# Patient Record
Sex: MALE | Race: WHITE | NOT HISPANIC OR LATINO | Employment: OTHER
[De-identification: names, ages, dates, MRNs, and addresses within clinical notes are randomized per-mention and may not be internally consistent; named-entity substitution may affect disease eponyms.]

---

## 2018-03-05 PROBLEM — Z86.010 PERSONAL HISTORY OF COLONIC POLYPS: Status: ACTIVE | Noted: 2018-03-05

## 2018-03-05 PROBLEM — K20.0 EOSINOPHILIC ESOPHAGITIS: Status: ACTIVE | Noted: 2018-03-05

## 2018-03-05 PROBLEM — Z86.0100 PERSONAL HISTORY OF COLONIC POLYPS: Status: ACTIVE | Noted: 2018-03-05

## 2018-06-12 ENCOUNTER — EXTERNAL RECORD (OUTPATIENT)
Dept: HEALTH INFORMATION MANAGEMENT | Facility: OTHER | Age: 63
End: 2018-06-12

## 2019-01-14 ENCOUNTER — HOSPITAL (OUTPATIENT)
Dept: OTHER | Age: 64
End: 2019-01-14

## 2019-01-15 ENCOUNTER — HOSPITAL (OUTPATIENT)
Dept: OTHER | Age: 64
End: 2019-01-15

## 2019-10-30 ENCOUNTER — OFFICE VISIT (OUTPATIENT)
Dept: OTHER | Facility: HOSPITAL | Age: 64
End: 2019-10-30
Attending: FAMILY MEDICINE
Payer: COMMERCIAL

## 2019-10-30 ENCOUNTER — TELEPHONE (OUTPATIENT)
Dept: NEUROLOGY | Facility: CLINIC | Age: 64
End: 2019-10-30

## 2019-10-30 ENCOUNTER — OFFICE VISIT (OUTPATIENT)
Dept: NEUROLOGY | Facility: CLINIC | Age: 64
End: 2019-10-30
Payer: COMMERCIAL

## 2019-10-30 VITALS
BODY MASS INDEX: 31.4 KG/M2 | WEIGHT: 212 LBS | SYSTOLIC BLOOD PRESSURE: 142 MMHG | HEART RATE: 80 BPM | RESPIRATION RATE: 18 BRPM | DIASTOLIC BLOOD PRESSURE: 84 MMHG | HEIGHT: 69 IN

## 2019-10-30 DIAGNOSIS — S83.8X1A ACUTE LATERAL MENISCAL INJURY OF RIGHT KNEE, INITIAL ENCOUNTER: ICD-10-CM

## 2019-10-30 DIAGNOSIS — Z77.21 PERSONAL HISTORY OF EXPOSURE TO POTENTIALLY HAZARDOUS BODY FLUIDS: Primary | ICD-10-CM

## 2019-10-30 DIAGNOSIS — S83.411A SPRAIN OF MEDIAL COLLATERAL LIGAMENT OF RIGHT KNEE, INITIAL ENCOUNTER: ICD-10-CM

## 2019-10-30 DIAGNOSIS — M25.461 EFFUSION OF RIGHT KNEE: ICD-10-CM

## 2019-10-30 DIAGNOSIS — M25.561 ACUTE PAIN OF RIGHT KNEE: ICD-10-CM

## 2019-10-30 PROCEDURE — 86803 HEPATITIS C AB TEST: CPT

## 2019-10-30 PROCEDURE — 20611 DRAIN/INJ JOINT/BURSA W/US: CPT | Performed by: PHYSICAL MEDICINE & REHABILITATION

## 2019-10-30 PROCEDURE — 87340 HEPATITIS B SURFACE AG IA: CPT

## 2019-10-30 PROCEDURE — 99204 OFFICE O/P NEW MOD 45 MIN: CPT | Performed by: PHYSICAL MEDICINE & REHABILITATION

## 2019-10-30 PROCEDURE — 86701 HIV-1ANTIBODY: CPT

## 2019-10-30 RX ORDER — SILDENAFIL 100 MG/1
TABLET, FILM COATED ORAL
Refills: 1 | COMMUNITY
Start: 2019-07-26 | End: 2020-08-03

## 2019-10-30 RX ORDER — LIDOCAINE HYDROCHLORIDE 10 MG/ML
5 INJECTION, SOLUTION INFILTRATION; PERINEURAL ONCE
Status: COMPLETED | OUTPATIENT
Start: 2019-10-30 | End: 2019-10-30

## 2019-10-30 RX ORDER — LIDOCAINE HYDROCHLORIDE 10 MG/ML
4 INJECTION, SOLUTION INFILTRATION; PERINEURAL ONCE
Status: COMPLETED | OUTPATIENT
Start: 2019-10-30 | End: 2019-10-30

## 2019-10-30 RX ORDER — TRIAMCINOLONE ACETONIDE 40 MG/ML
40 INJECTION, SUSPENSION INTRA-ARTICULAR; INTRAMUSCULAR ONCE
Status: COMPLETED | OUTPATIENT
Start: 2019-10-30 | End: 2019-10-30

## 2019-10-30 RX ADMIN — TRIAMCINOLONE ACETONIDE 40 MG: 40 INJECTION, SUSPENSION INTRA-ARTICULAR; INTRAMUSCULAR at 16:05:00

## 2019-10-30 RX ADMIN — LIDOCAINE HYDROCHLORIDE 5 ML: 10 INJECTION, SOLUTION INFILTRATION; PERINEURAL at 16:03:00

## 2019-10-30 RX ADMIN — LIDOCAINE HYDROCHLORIDE 4 ML: 10 INJECTION, SOLUTION INFILTRATION; PERINEURAL at 16:04:00
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
## 2019-10-30 NOTE — H&P
[facility] 98  [address] [facility] H&P    Requesting Physician: Meg Sands    Chief Complaint (Reason for Visit):  No chief complaint on file. History of Present Illness:   The patient is a 59year old RIGHT handed m 1  ergocalciferol 67990 units Oral Cap, TK 1 C PO 1 TIME A WK, Disp: , Rfl: 1  PEG 3350-KCl-NaBcb-NaCl-NaSulf (PEG 3350/ELECTROLYTES) 240 g Oral Recon Soln, Take as directed by physician., Disp: 4000 mL, Rfl: 0  OMEPRAZOLE 20 MG Oral Capsule Delayed Releas lower extremities   Sensation: Intact to light touch in all dermatomes of the lower extremities  Reflexes: 2/4 at L4 and S1  Alex Test: negative for pain over patella  Lachmans: negative for instability  Anterior / Posterior drawer: negative for instabili

## 2019-10-30 NOTE — TELEPHONE ENCOUNTER
[address] [facility] for authorization of approval for RIGHT knee aspiration and CSI under ultrasound guidance CPT code: 69215,79133, - APPROVEDSpoke to [address] [place] [address]  who states no authorization is required. Reference call# [id number].  Will inform nursing

## 2019-10-30 NOTE — TELEPHONE ENCOUNTER
93 [facility] for authorization of approval for MRI KNEE, RIGHT CPT Code 83085- Spoke to [address] [place] [address]  who states no authorization is required. Reference call# [id number]. Will inform patient.    L/M for patient, informed insurance was verified and MRI KN

## 2019-10-31 ENCOUNTER — TELEPHONE (OUTPATIENT)
Dept: NEUROLOGY | Facility: CLINIC | Age: 64
End: 2019-10-31

## 2019-11-06 NOTE — PROCEDURES
[address]   Knee Joint Injection Procedure Note    CHIEF COMPLAINT:  No chief complaint on file.       PROCEDURE PERFORMED:  right knee intra-articular corticosteroid injection under ultrasound guidance    I identified. Permanent pictures were saved. INSTRUCTIONS GIVEN TO PATIENT:    \"You will see an effect in the next 2-3 days.   Please contact me if you have fevers, worsening swelling, worsening pain, decreased range of motion, increased redness, chill

## 2019-11-06 NOTE — TELEPHONE ENCOUNTER
Received disc of X-ray of right knee done 10/29/19 at [facility]-no report  Disc loaded to [address] [place] [address] PACs and mailed back to patient

## 2019-11-15 ENCOUNTER — HOSPITAL ENCOUNTER (OUTPATIENT)
Dept: MRI IMAGING | Facility: HOSPITAL | Age: 64
Discharge: HOME | End: 2019-11-15
Attending: PHYSICAL MEDICINE & REHABILITATION
Payer: COMMERCIAL

## 2019-11-15 DIAGNOSIS — S83.8X1A ACUTE LATERAL MENISCAL INJURY OF RIGHT KNEE, INITIAL ENCOUNTER: ICD-10-CM

## 2019-11-15 DIAGNOSIS — S83.411A SPRAIN OF MEDIAL COLLATERAL LIGAMENT OF RIGHT KNEE, INITIAL ENCOUNTER: ICD-10-CM

## 2019-11-15 DIAGNOSIS — M25.561 ACUTE PAIN OF RIGHT KNEE: ICD-10-CM

## 2019-11-15 DIAGNOSIS — M25.461 EFFUSION OF RIGHT KNEE: ICD-10-CM

## 2019-11-15 PROCEDURE — 73721 MRI JNT OF LWR EXTRE W/O DYE: CPT | Performed by: PHYSICAL MEDICINE & REHABILITATION

## 2019-12-12 ENCOUNTER — HOSPITAL ENCOUNTER (OUTPATIENT)
Dept: GENERAL RADIOLOGY | Facility: HOSPITAL | Age: 64
Discharge: HOME | End: 2019-12-12
Attending: PHYSICAL MEDICINE & REHABILITATION
Payer: COMMERCIAL

## 2019-12-12 ENCOUNTER — OFFICE VISIT (OUTPATIENT)
Dept: NEUROLOGY | Facility: CLINIC | Age: 64
End: 2019-12-12
Payer: COMMERCIAL

## 2019-12-12 VITALS — RESPIRATION RATE: 18 BRPM | BODY MASS INDEX: 31.4 KG/M2 | HEIGHT: 69 IN | WEIGHT: 212 LBS

## 2019-12-12 DIAGNOSIS — S83.411A SPRAIN OF MEDIAL COLLATERAL LIGAMENT OF RIGHT KNEE, INITIAL ENCOUNTER: ICD-10-CM

## 2019-12-12 DIAGNOSIS — S83.8X1A ACUTE LATERAL MENISCAL INJURY OF RIGHT KNEE, INITIAL ENCOUNTER: ICD-10-CM

## 2019-12-12 DIAGNOSIS — M25.521 RIGHT ELBOW PAIN: ICD-10-CM

## 2019-12-12 DIAGNOSIS — M25.461 EFFUSION OF RIGHT KNEE: ICD-10-CM

## 2019-12-12 DIAGNOSIS — M25.561 ACUTE PAIN OF RIGHT KNEE: ICD-10-CM

## 2019-12-12 DIAGNOSIS — M25.521 RIGHT ELBOW PAIN: Primary | ICD-10-CM

## 2019-12-12 PROCEDURE — 99214 OFFICE O/P EST MOD 30 MIN: CPT | Performed by: PHYSICAL MEDICINE & REHABILITATION

## 2019-12-12 PROCEDURE — 73080 X-RAY EXAM OF ELBOW: CPT | Performed by: PHYSICAL MEDICINE & REHABILITATION
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
## 2019-12-12 NOTE — PATIENT INSTRUCTIONS
1) Get XR of the RIGHT elbow on your way out today  2) Start using diclofenac gel over the elbow 2-4 times per day  3) Keep doing what you are doing for the knee. 4) Follow up with me as needed.

## 2020-04-13 ENCOUNTER — TELEPHONE (OUTPATIENT)
Dept: NEUROLOGY | Facility: CLINIC | Age: 65
End: 2020-04-13

## 2020-04-14 ENCOUNTER — TELEPHONE (OUTPATIENT)
Dept: NEUROLOGY | Facility: CLINIC | Age: 65
End: 2020-04-14

## 2020-04-14 ENCOUNTER — VIRTUAL PHONE E/M (OUTPATIENT)
Dept: NEUROLOGY | Facility: CLINIC | Age: 65
End: 2020-04-14
Payer: COMMERCIAL

## 2020-04-14 DIAGNOSIS — M22.2X9 PATELLOFEMORAL PAIN SYNDROME, UNSPECIFIED LATERALITY: ICD-10-CM

## 2020-04-14 DIAGNOSIS — S83.411A SPRAIN OF MEDIAL COLLATERAL LIGAMENT OF RIGHT KNEE, INITIAL ENCOUNTER: ICD-10-CM

## 2020-04-14 DIAGNOSIS — M17.12 PRIMARY OSTEOARTHRITIS OF LEFT KNEE: Primary | ICD-10-CM

## 2020-04-14 DIAGNOSIS — M25.461 EFFUSION OF RIGHT KNEE: ICD-10-CM

## 2020-04-14 DIAGNOSIS — S83.8X1A ACUTE LATERAL MENISCAL INJURY OF RIGHT KNEE, INITIAL ENCOUNTER: ICD-10-CM

## 2020-04-14 DIAGNOSIS — M25.561 ACUTE PAIN OF RIGHT KNEE: ICD-10-CM

## 2020-04-14 PROCEDURE — 99213 OFFICE O/P EST LOW 20 MIN: CPT | Performed by: PHYSICAL MEDICINE & REHABILITATION

## 2020-04-14 NOTE — PROGRESS NOTES
[address]  Televisit Note    CHIEF COMPLAINT:      Virtual/Telephone Check-In    Sammy Gómez verbally consents to a Virtual/Telephone Check-In service on 04/14/20.   Patient understands and accepts fi DINNER 180 capsule 0   • Sildenafil Citrate 100 MG Oral Tab TK 1 T PO ONCE A DAY PRN  1   • aspirin 81 MG Oral Tab EC Take 81 mg by mouth daily.      • Levothyroxine Sodium 137 MCG Oral Tab TK 1 T PO QD IN THE MORNING OES  1   • ergocalciferol 85169 units O Intact  PCL:     Intact  MCL:     Mild thickening with increased intrasubstance signal within the MCL with mild periligamentous edema. There is no full-thickness tear.   LCL COMPLEX:             Intact     PATELLOFEMORAL EXTENSOR MECHANISM:             Mil osteoarthritis. Low-grade strain at the origin of the soleus muscle. Grade 1 MCL sprain. Mild quadriceps and patellar tendinosis.            Dictated by (CST): Brett Baxter MD on 11/15/2019 at 12:38       Approved by (CST): Brett Baxter MD on 19

## 2020-04-14 NOTE — TELEPHONE ENCOUNTER
Called [facility] BS for authorization of approval of LEFT knee Durolane and CSI under ultrasound guidance cpt codes 21934, [id number], [id number]. Talked to Jenette Cranker. who states authorization is not required.  Reference #  [id number]             Procedure ma

## 2020-04-15 ENCOUNTER — HOSPITAL ENCOUNTER (OUTPATIENT)
Dept: GENERAL RADIOLOGY | Facility: HOSPITAL | Age: 65
Discharge: HOME | End: 2020-04-15
Attending: PHYSICAL MEDICINE & REHABILITATION
Payer: COMMERCIAL

## 2020-04-15 ENCOUNTER — TELEPHONE (OUTPATIENT)
Dept: NEUROLOGY | Facility: CLINIC | Age: 65
End: 2020-04-15

## 2020-04-15 DIAGNOSIS — M17.12 PRIMARY OSTEOARTHRITIS OF LEFT KNEE: ICD-10-CM

## 2020-04-15 DIAGNOSIS — M22.2X9 PATELLOFEMORAL PAIN SYNDROME, UNSPECIFIED LATERALITY: ICD-10-CM

## 2020-04-15 DIAGNOSIS — S83.8X1A ACUTE LATERAL MENISCAL INJURY OF RIGHT KNEE, INITIAL ENCOUNTER: ICD-10-CM

## 2020-04-15 DIAGNOSIS — M25.461 EFFUSION OF RIGHT KNEE: ICD-10-CM

## 2020-04-15 DIAGNOSIS — M25.561 ACUTE PAIN OF RIGHT KNEE: ICD-10-CM

## 2020-04-15 DIAGNOSIS — S83.411A SPRAIN OF MEDIAL COLLATERAL LIGAMENT OF RIGHT KNEE, INITIAL ENCOUNTER: ICD-10-CM

## 2020-04-15 DIAGNOSIS — M17.11 PRIMARY OSTEOARTHRITIS OF RIGHT KNEE: Primary | ICD-10-CM

## 2020-04-15 PROCEDURE — 73564 X-RAY EXAM KNEE 4 OR MORE: CPT | Performed by: PHYSICAL MEDICINE & REHABILITATION

## 2020-04-15 NOTE — TELEPHONE ENCOUNTER
[facility] radiology called stating patient is downstairs waiting to have Xray on his right knee but the order was for L Knee. Per Virtual visit on 04/14/20 patient was following up for R Knee pain. XR R Knee placed.

## 2020-04-15 NOTE — TELEPHONE ENCOUNTER
Called [facility] BS for authorization of approval of RIGHT knee HA and CSI under ultrasound guidance cpt codes 98307, [id number], [id number]. Talked to Spring TSAI/Florencia RICHARDS  who states authorization is not required.  Reference #   [id number] cpt code R23

## 2020-04-15 NOTE — TELEPHONE ENCOUNTER
Yes. Thank you for ordering. Injections ordered. Please schedule for June or July to have injections performed. Colonel Cates.  Faina Koenig MD, [address] [facility]  Physical Medicine and Rehabilitation/Sports Medicine  MEDICAL CENTER [facility]

## 2020-04-15 NOTE — TELEPHONE ENCOUNTER
RTC in June 2020 for a left knee hyaluronic acid and corticosteroid injection under ultrasound guidance

## 2020-04-16 NOTE — TELEPHONE ENCOUNTER
Called [facility] BS for authorization of approval of RIGHT knee HA and CSI under ultrasound guidance cpt codes 62089, V244621, [id number]-, .   Talked to Anusha Lopez, who states cpt code  is not considered medically necessary so it is NOT a covered

## 2020-05-04 NOTE — TELEPHONE ENCOUNTER
Called [facility] BS for authorization of approval of RIGHT knee HA and CSI under ultrasound guidance cpt codes 04055, 76691, Synvisc One cpt code  and  or [id number]-, .  Talked to Maria Isabel Vela states cpt code ,  are not considered med

## 2020-05-06 NOTE — TELEPHONE ENCOUNTER
Left detailed message informing patient HA injections are not covered by his insurance and he will be required to pay the full amount. Instructed patient to call back and let us know if he would like to have injections and that we can schedule him in June.

## 2020-05-12 NOTE — TELEPHONE ENCOUNTER
Patient verbalized understanding that he will be responsible for payment of Injections and is declining to proceed with Injections and will follow up with an office appt instead

## 2020-06-24 ENCOUNTER — OFFICE VISIT (OUTPATIENT)
Dept: NEUROLOGY | Facility: CLINIC | Age: 65
End: 2020-06-24
Payer: COMMERCIAL

## 2020-06-24 VITALS
DIASTOLIC BLOOD PRESSURE: 70 MMHG | HEART RATE: 82 BPM | BODY MASS INDEX: 30.36 KG/M2 | WEIGHT: 205 LBS | SYSTOLIC BLOOD PRESSURE: 130 MMHG | HEIGHT: 69 IN

## 2020-06-24 DIAGNOSIS — G89.29 CHRONIC PAIN OF BOTH KNEES: ICD-10-CM

## 2020-06-24 DIAGNOSIS — M25.462 BILATERAL KNEE EFFUSIONS: ICD-10-CM

## 2020-06-24 DIAGNOSIS — M25.561 CHRONIC PAIN OF BOTH KNEES: ICD-10-CM

## 2020-06-24 DIAGNOSIS — M25.562 CHRONIC PAIN OF BOTH KNEES: ICD-10-CM

## 2020-06-24 DIAGNOSIS — M25.461 BILATERAL KNEE EFFUSIONS: ICD-10-CM

## 2020-06-24 DIAGNOSIS — M17.0 BILATERAL PRIMARY OSTEOARTHRITIS OF KNEE: Primary | ICD-10-CM

## 2020-06-24 PROCEDURE — 99214 OFFICE O/P EST MOD 30 MIN: CPT | Performed by: PHYSICAL MEDICINE & REHABILITATION

## 2020-06-24 PROCEDURE — 20611 DRAIN/INJ JOINT/BURSA W/US: CPT | Performed by: PHYSICAL MEDICINE & REHABILITATION

## 2020-06-24 RX ORDER — LIDOCAINE HYDROCHLORIDE 10 MG/ML
3 INJECTION, SOLUTION INFILTRATION; PERINEURAL ONCE
Status: COMPLETED | OUTPATIENT
Start: 2020-06-24 | End: 2020-06-24

## 2020-06-24 RX ORDER — TRIAMCINOLONE ACETONIDE 40 MG/ML
40 INJECTION, SUSPENSION INTRA-ARTICULAR; INTRAMUSCULAR ONCE
Status: COMPLETED | OUTPATIENT
Start: 2020-06-24 | End: 2020-06-24

## 2020-06-24 RX ORDER — LIDOCAINE HYDROCHLORIDE 10 MG/ML
4 INJECTION, SOLUTION INFILTRATION; PERINEURAL ONCE
Status: COMPLETED | OUTPATIENT
Start: 2020-06-24 | End: 2020-06-24

## 2020-06-24 NOTE — PROCEDURES
[address]   Knee Joint Injection Procedure Note    CHIEF COMPLAINT:  Patient presents with: Other: Pt presents with pain bilateral knees. Pain is 4-5/10 and walkes pt up at night. Walking relieves pain.  D intra-articular space. The needle was then removed, hemostasis was obtained, Band-Aid was applied. Patient tolerated the procedure well. The patient was able to get up and ambulate. The pre-injection pain score was 5/10.  The post-injection pain score was further evaluation\"      Parker Byers.  Erika Blake MD, [address] [facility]  Physical Medicine and Rehabilitation/Sports Medicine  MEDICAL CENTER [facility]

## 2020-06-24 NOTE — PROGRESS NOTES
[address]  Progress Note    CHIEF COMPLAINT:  Patient presents with: Other: Pt presents with pain bilateral knees. Pain is 4-5/10 and walkes pt up at night. Walking relieves pain. Denies n/t. Stiffness. MINUTES BEFORE BREAKFAST 90 capsule 0       ALLERGIES:   No Known Allergies    REVIEW OF SYSTEMS:   Review of Systems   Constitutional: Negative. HENT: Negative. Eyes: Negative. Respiratory: Negative. Cardiovascular: Negative.     Gastrointestin \"catch\"    Gait:  antalgic    Data  No visits with results within 6 Month(s) from this visit.    Latest known visit with results is:   Office Visit on 10/30/2019   Component Date Value Ref Range Status   • Hepatitis B Surface Antigen 10/30/2019 Nonreactiv acid injections. RTC in 2 months  Discharge Instructions were provided as documented in AVS summary. The patient was in agreement with the assessment and plan. All questions were answered. There were no barriers to learning.        Bilateral primar

## 2020-07-02 ENCOUNTER — TELEPHONE (OUTPATIENT)
Dept: NEUROLOGY | Facility: CLINIC | Age: 65
End: 2020-07-02

## 2020-07-11 ENCOUNTER — LAB ENCOUNTER (OUTPATIENT)
Dept: LAB | Facility: HOSPITAL | Age: 65
End: 2020-07-11
Attending: INTERNAL MEDICINE
Payer: COMMERCIAL

## 2020-07-11 DIAGNOSIS — Z01.818 PRE-OP TESTING: ICD-10-CM

## 2020-07-11 LAB — SARS-COV-2 RNA RESP QL NAA+PROBE: NOT DETECTED

## 2020-10-01 ENCOUNTER — TELEPHONE (OUTPATIENT)
Dept: NEUROLOGY | Facility: CLINIC | Age: 65
End: 2020-10-01

## 2020-10-01 ENCOUNTER — OFFICE VISIT (OUTPATIENT)
Dept: NEUROLOGY | Facility: CLINIC | Age: 65
End: 2020-10-01
Payer: COMMERCIAL

## 2020-10-01 VITALS — BODY MASS INDEX: 30.36 KG/M2 | HEIGHT: 69 IN | WEIGHT: 205 LBS

## 2020-10-01 DIAGNOSIS — G89.29 CHRONIC PAIN OF BOTH KNEES: ICD-10-CM

## 2020-10-01 DIAGNOSIS — S83.8X1A ACUTE LATERAL MENISCAL INJURY OF RIGHT KNEE, INITIAL ENCOUNTER: ICD-10-CM

## 2020-10-01 DIAGNOSIS — M22.2X9 PATELLOFEMORAL PAIN SYNDROME, UNSPECIFIED LATERALITY: ICD-10-CM

## 2020-10-01 DIAGNOSIS — M17.11 PRIMARY OSTEOARTHRITIS OF RIGHT KNEE: ICD-10-CM

## 2020-10-01 DIAGNOSIS — M25.461 BILATERAL KNEE EFFUSIONS: Primary | ICD-10-CM

## 2020-10-01 DIAGNOSIS — M25.461 EFFUSION OF RIGHT KNEE: ICD-10-CM

## 2020-10-01 DIAGNOSIS — M17.12 PRIMARY OSTEOARTHRITIS OF LEFT KNEE: ICD-10-CM

## 2020-10-01 DIAGNOSIS — M17.0 BILATERAL PRIMARY OSTEOARTHRITIS OF KNEE: ICD-10-CM

## 2020-10-01 DIAGNOSIS — M25.561 ACUTE PAIN OF RIGHT KNEE: ICD-10-CM

## 2020-10-01 DIAGNOSIS — M25.561 CHRONIC PAIN OF BOTH KNEES: ICD-10-CM

## 2020-10-01 DIAGNOSIS — M25.562 CHRONIC PAIN OF BOTH KNEES: ICD-10-CM

## 2020-10-01 DIAGNOSIS — M25.462 BILATERAL KNEE EFFUSIONS: Primary | ICD-10-CM

## 2020-10-01 PROCEDURE — 3008F BODY MASS INDEX DOCD: CPT | Performed by: PHYSICAL MEDICINE & REHABILITATION

## 2020-10-01 PROCEDURE — 99214 OFFICE O/P EST MOD 30 MIN: CPT | Performed by: PHYSICAL MEDICINE & REHABILITATION

## 2020-10-01 NOTE — TELEPHONE ENCOUNTER
BILATERAL knee HA and CSI under ultrasound guidance CPT Code 18657, , 77214 () X2-partial approval.     Called [facility] for authorization of approval for above.  Spoke to Taye Lewis who states no authorization is required for   CPT CODE 06316, T37

## 2020-10-01 NOTE — PATIENT INSTRUCTIONS
1) My office will call you to schedule the BILATERAL knee HA and CSI under ultrasound guidance once the procedure is approved by your insurance carrier. 2) I will give you home exercises. Please do them daily.

## 2020-10-01 NOTE — PROGRESS NOTES
[address]  Progress Note    CHIEF COMPLAINT:  Patient presents with:  Knee Pain: LOV 06/24/20 Pt states that hes here den knee pain. History of Present Illness:   The patient is a 72year old right-hope MG Oral Tab EC Take 81 mg by mouth daily. • Levothyroxine Sodium 137 MCG Oral Tab TK 1 T PO QD IN THE MORNING OES  1       ALLERGIES:   No Known Allergies    REVIEW OF SYSTEMS:   Review of Systems   Constitutional: Negative. HENT: Negative.     Eyes: Component Date Value Ref Range Status   • Rapid SARS-CoV-2 by PCR 07/11/2020 Not Detected  Not Detected Final   ]      Radiology Imaging:  I reviewed with the patient his X-ray of the knees right from 4/15/2020  XR KNEE, COMPLETE (4 OR MORE VIEWS), RIGHT learning.      Bilateral knee effusions  (primary encounter diagnosis)  Bilateral primary osteoarthritis of knee  Chronic pain of both knees  Acute pain of right knee  Primary osteoarthritis of left knee  Effusion of right knee  Patellofemoral pain syndrome

## 2020-10-02 NOTE — TELEPHONE ENCOUNTER
HA is not a covered benefit in patient's insurance plan. Will check with Dr. Anitra Vieyra to see if patient can be scheduled for just steroid injections.

## 2020-10-07 NOTE — TELEPHONE ENCOUNTER
Spoke to patient and notified him of denial. Patient was understanding. He states that his knees are doing okay now but if they worsen he will make a follow-up with Dr. Sidney Arango. He was very thankful for the return call.

## 2020-12-18 NOTE — TELEPHONE ENCOUNTER
Per Jag Handing; [address] denial letter received for . Requested to call [place] to verify coverage for .      Called [facility] provider services, spoke to Jatin who states Darby Gutierrez) is not cover in the members plan nor medically necessary per

## 2021-02-26 ENCOUNTER — LAB REQUISITION (OUTPATIENT)
Dept: LAB | Age: 66
End: 2021-02-26

## 2021-02-26 DIAGNOSIS — E04.2 NONTOXIC MULTINODULAR GOITER: ICD-10-CM

## 2021-02-26 DIAGNOSIS — E03.9 HYPOTHYROIDISM, UNSPECIFIED: ICD-10-CM

## 2021-02-26 DIAGNOSIS — I10 ESSENTIAL (PRIMARY) HYPERTENSION: ICD-10-CM

## 2021-02-26 DIAGNOSIS — R73.01 IMPAIRED FASTING GLUCOSE: ICD-10-CM

## 2021-02-26 DIAGNOSIS — E55.9 VITAMIN D DEFICIENCY, UNSPECIFIED: ICD-10-CM

## 2021-02-26 DIAGNOSIS — E78.2 MIXED HYPERLIPIDEMIA: ICD-10-CM

## 2021-02-26 LAB
CHOLEST SERPL-MCNC: 218 MG/DL
CHOLEST/HDLC SERPL: 4.7 {RATIO}
FASTING DURATION TIME PATIENT: 12 HOURS
HDLC SERPL-MCNC: 46 MG/DL
LDLC SERPL CALC-MCNC: 155 MG/DL
NONHDLC SERPL-MCNC: 172 MG/DL
TRIGL SERPL-MCNC: 84 MG/DL
TSH SERPL-ACNC: 16.16 MCUNITS/ML (ref 0.35–5)

## 2021-02-26 PROCEDURE — 84443 ASSAY THYROID STIM HORMONE: CPT | Performed by: CLINICAL MEDICAL LABORATORY

## 2021-02-26 PROCEDURE — 80061 LIPID PANEL: CPT | Performed by: CLINICAL MEDICAL LABORATORY

## 2021-03-15 ENCOUNTER — LAB REQUISITION (OUTPATIENT)
Dept: LAB | Age: 66
End: 2021-03-15

## 2021-03-15 DIAGNOSIS — R73.01 IMPAIRED FASTING GLUCOSE: ICD-10-CM

## 2021-03-15 DIAGNOSIS — Z00.01 ENCOUNTER FOR GENERAL ADULT MEDICAL EXAMINATION WITH ABNORMAL FINDINGS: ICD-10-CM

## 2021-03-15 DIAGNOSIS — E78.5 HYPERLIPIDEMIA, UNSPECIFIED: ICD-10-CM

## 2021-03-15 DIAGNOSIS — E55.9 VITAMIN D DEFICIENCY, UNSPECIFIED: ICD-10-CM

## 2021-03-15 DIAGNOSIS — K20.0 EOSINOPHILIC ESOPHAGITIS: ICD-10-CM

## 2021-03-15 DIAGNOSIS — Z12.5 ENCOUNTER FOR SCREENING FOR MALIGNANT NEOPLASM OF PROSTATE: ICD-10-CM

## 2021-03-15 LAB
25(OH)D3+25(OH)D2 SERPL-MCNC: 31.8 NG/ML (ref 30–100)
ALBUMIN SERPL-MCNC: 4 G/DL (ref 3.6–5.1)
ALBUMIN/GLOB SERPL: 1.4 {RATIO} (ref 1–2.4)
ALP SERPL-CCNC: 93 UNITS/L (ref 45–117)
ALT SERPL-CCNC: 24 UNITS/L
ANION GAP SERPL CALC-SCNC: 7 MMOL/L (ref 10–20)
AST SERPL-CCNC: 24 UNITS/L
BASOPHILS # BLD: 0.1 K/MCL (ref 0–0.3)
BASOPHILS NFR BLD: 1 %
BILIRUB SERPL-MCNC: 0.7 MG/DL (ref 0.2–1)
BUN SERPL-MCNC: 10 MG/DL (ref 6–20)
BUN/CREAT SERPL: 11 (ref 7–25)
CALCIUM SERPL-MCNC: 9 MG/DL (ref 8.4–10.2)
CHLORIDE SERPL-SCNC: 106 MMOL/L (ref 98–107)
CO2 SERPL-SCNC: 31 MMOL/L (ref 21–32)
CREAT SERPL-MCNC: 0.88 MG/DL (ref 0.67–1.17)
DEPRECATED RDW RBC: 44 FL (ref 39–50)
EOSINOPHIL # BLD: 0.6 K/MCL (ref 0–0.5)
EOSINOPHIL NFR BLD: 11 %
ERYTHROCYTE [DISTWIDTH] IN BLOOD: 12.8 % (ref 11–15)
FASTING DURATION TIME PATIENT: 0 HOURS
GFR SERPLBLD BASED ON 1.73 SQ M-ARVRAT: 90 ML/MIN/1.73M2
GLOBULIN SER-MCNC: 2.8 G/DL (ref 2–4)
GLUCOSE SERPL-MCNC: 114 MG/DL (ref 65–99)
HBA1C MFR BLD: 5.5 % (ref 4.5–5.6)
HCT VFR BLD CALC: 46.1 % (ref 39–51)
HGB BLD-MCNC: 14.3 G/DL (ref 13–17)
IMM GRANULOCYTES # BLD AUTO: 0 K/MCL (ref 0–0.2)
IMM GRANULOCYTES # BLD: 0 %
LYMPHOCYTES # BLD: 1.2 K/MCL (ref 1–4)
LYMPHOCYTES NFR BLD: 21 %
MCH RBC QN AUTO: 29.3 PG (ref 26–34)
MCHC RBC AUTO-ENTMCNC: 31 G/DL (ref 32–36.5)
MCV RBC AUTO: 94.5 FL (ref 78–100)
MONOCYTES # BLD: 0.6 K/MCL (ref 0.3–0.9)
MONOCYTES NFR BLD: 9 %
NEUTROPHILS # BLD: 3.5 K/MCL (ref 1.8–7.7)
NEUTROPHILS NFR BLD: 58 %
NRBC BLD MANUAL-RTO: 0 /100 WBC
PLATELET # BLD AUTO: 375 K/MCL (ref 140–450)
POTASSIUM SERPL-SCNC: 4.4 MMOL/L (ref 3.4–5.1)
PROT SERPL-MCNC: 6.8 G/DL (ref 6.4–8.2)
PSA SERPL-MCNC: 1.13 NG/ML
RBC # BLD: 4.88 MIL/MCL (ref 4.5–5.9)
SODIUM SERPL-SCNC: 140 MMOL/L (ref 135–145)
WBC # BLD: 6.1 K/MCL (ref 4.2–11)

## 2021-03-15 PROCEDURE — 82306 VITAMIN D 25 HYDROXY: CPT | Performed by: CLINICAL MEDICAL LABORATORY

## 2021-03-15 PROCEDURE — 83036 HEMOGLOBIN GLYCOSYLATED A1C: CPT | Performed by: CLINICAL MEDICAL LABORATORY

## 2021-03-15 PROCEDURE — 80053 COMPREHEN METABOLIC PANEL: CPT | Performed by: CLINICAL MEDICAL LABORATORY

## 2021-03-15 PROCEDURE — 85025 COMPLETE CBC W/AUTO DIFF WBC: CPT | Performed by: CLINICAL MEDICAL LABORATORY

## 2021-03-15 PROCEDURE — G0103 PSA SCREENING: HCPCS | Performed by: CLINICAL MEDICAL LABORATORY

## 2021-06-01 PROCEDURE — 80061 LIPID PANEL: CPT | Performed by: CLINICAL MEDICAL LABORATORY

## 2021-06-01 PROCEDURE — 84443 ASSAY THYROID STIM HORMONE: CPT | Performed by: CLINICAL MEDICAL LABORATORY

## 2021-06-01 PROCEDURE — 83036 HEMOGLOBIN GLYCOSYLATED A1C: CPT | Performed by: CLINICAL MEDICAL LABORATORY

## 2021-06-02 ENCOUNTER — LAB REQUISITION (OUTPATIENT)
Dept: LAB | Age: 66
End: 2021-06-02

## 2021-06-02 DIAGNOSIS — E78.5 HYPERLIPIDEMIA, UNSPECIFIED: ICD-10-CM

## 2021-06-02 DIAGNOSIS — R73.01 IMPAIRED FASTING GLUCOSE: ICD-10-CM

## 2021-06-02 DIAGNOSIS — E03.9 HYPOTHYROIDISM, UNSPECIFIED: ICD-10-CM

## 2021-06-02 LAB
CHOLEST SERPL-MCNC: 202 MG/DL
CHOLEST/HDLC SERPL: 4.1 {RATIO}
FASTING DURATION TIME PATIENT: ABNORMAL H
HBA1C MFR BLD: 5.7 % (ref 4.5–5.6)
HDLC SERPL-MCNC: 49 MG/DL
LDLC SERPL CALC-MCNC: 138 MG/DL
NONHDLC SERPL-MCNC: 153 MG/DL
TRIGL SERPL-MCNC: 77 MG/DL
TSH SERPL-ACNC: 0.86 MCUNITS/ML (ref 0.35–5)

## 2021-08-26 ENCOUNTER — TELEPHONE (OUTPATIENT)
Dept: NEUROLOGY | Facility: CLINIC | Age: 66
End: 2021-08-26

## 2021-08-26 ENCOUNTER — OFFICE VISIT (OUTPATIENT)
Dept: NEUROLOGY | Facility: CLINIC | Age: 66
End: 2021-08-26
Payer: MEDICARE

## 2021-08-26 VITALS
HEART RATE: 87 BPM | WEIGHT: 215 LBS | DIASTOLIC BLOOD PRESSURE: 60 MMHG | BODY MASS INDEX: 31.84 KG/M2 | HEIGHT: 69 IN | SYSTOLIC BLOOD PRESSURE: 122 MMHG | OXYGEN SATURATION: 95 %

## 2021-08-26 DIAGNOSIS — M25.561 CHRONIC PAIN OF BOTH KNEES: ICD-10-CM

## 2021-08-26 DIAGNOSIS — M25.461 BILATERAL KNEE EFFUSIONS: Primary | ICD-10-CM

## 2021-08-26 DIAGNOSIS — M22.2X9 PATELLOFEMORAL PAIN SYNDROME, UNSPECIFIED LATERALITY: ICD-10-CM

## 2021-08-26 DIAGNOSIS — G89.29 CHRONIC PAIN OF BOTH KNEES: ICD-10-CM

## 2021-08-26 DIAGNOSIS — M25.561 ACUTE PAIN OF RIGHT KNEE: ICD-10-CM

## 2021-08-26 DIAGNOSIS — M17.0 BILATERAL PRIMARY OSTEOARTHRITIS OF KNEE: ICD-10-CM

## 2021-08-26 DIAGNOSIS — M25.462 BILATERAL KNEE EFFUSIONS: Primary | ICD-10-CM

## 2021-08-26 DIAGNOSIS — M25.562 CHRONIC PAIN OF BOTH KNEES: ICD-10-CM

## 2021-08-26 PROCEDURE — 99214 OFFICE O/P EST MOD 30 MIN: CPT | Performed by: PHYSICAL MEDICINE & REHABILITATION

## 2021-08-26 NOTE — PATIENT INSTRUCTIONS
1) Get Xr of the left knee today on your way out  2) Tylenol 500-1000 mg every 6-8 hours as needed for pain. No more than 3000 mg daily. 3) Start Glucosamine with Chondroitin 1500/1200 mg daily.    4) My office will call you to schedule the BILATERAL knee

## 2021-08-26 NOTE — PROGRESS NOTES
[address]  Progress Note    CHIEF COMPLAINT:  Patient presents with:  Knee Pain: LOV: 10/1/2020 Patient was reccommneded Knee injections last october but insurance didnt approve and now he has medicare whic omeprazole 20 MG Oral Capsule Delayed Release Take one capsule (20 mg total) by mouth once daily, 30 minutes prior to breakfast. 90 capsule 3   • aspirin 81 MG Oral Tab EC Take 81 mg by mouth daily.      • Levothyroxine Sodium 137 MCG Oral Tab TK 1 T PO QD medial or lateral joint line pain or \"catch\"      Gait:  Genu varum    Data  No visits with results within 6 Month(s) from this visit.    Latest known visit with results is:   [facility] Lab Encounter on 07/11/2020   Component Date Value Ref Range Status   • Rap injections  Discharge Instructions were provided as documented in AVS summary. The patient was in agreement with the assessment and plan. All questions were answered. There were no barriers to learning.        Bilateral knee effusions  (primary encounter

## 2022-01-28 PROBLEM — M25.569 KNEE PAIN: Status: ACTIVE | Noted: 2019-10-30

## 2022-01-28 PROBLEM — M23.302 DERANGEMENT OF LATERAL MENISCUS: Status: ACTIVE | Noted: 2019-10-30

## 2022-01-28 PROBLEM — H81.11 BPPV (BENIGN PAROXYSMAL POSITIONAL VERTIGO), RIGHT: Status: ACTIVE | Noted: 2017-08-08

## 2022-01-28 PROBLEM — M17.0 PRIMARY OSTEOARTHRITIS OF BOTH KNEES: Status: ACTIVE | Noted: 2020-06-24

## 2022-01-28 PROBLEM — R29.90 ABNORMAL NEUROLOGICAL EXAM: Status: ACTIVE | Noted: 2017-08-08

## 2022-01-28 PROBLEM — E78.00 ELEVATED LOW DENSITY LIPOPROTEIN (LDL) CHOLESTEROL LEVEL: Status: ACTIVE | Noted: 2022-01-28

## 2022-01-28 PROBLEM — E03.9 HYPOTHYROIDISM: Status: ACTIVE | Noted: 2017-08-08

## 2022-01-28 PROBLEM — E11.9 TYPE 2 DIABETES MELLITUS WITHOUT COMPLICATION, WITHOUT LONG-TERM CURRENT USE OF INSULIN (HCC): Status: ACTIVE | Noted: 2017-08-08

## 2022-01-28 PROBLEM — R73.03 PREDIABETES: Status: ACTIVE | Noted: 2022-01-28

## 2022-01-28 PROBLEM — K63.5 POLYP OF COLON: Status: ACTIVE | Noted: 2022-01-28

## 2022-01-28 PROBLEM — M25.462 EFFUSION OF BOTH KNEE JOINTS: Status: ACTIVE | Noted: 2019-10-30

## 2022-01-28 PROBLEM — S83.419A SPRAIN OF MEDIAL COLLATERAL LIGAMENT OF KNEE: Status: ACTIVE | Noted: 2019-10-30

## 2022-01-28 PROBLEM — Z09 POSTOPERATIVE EXAMINATION: Status: ACTIVE | Noted: 2022-01-28

## 2022-01-28 PROBLEM — K21.9 GERD WITHOUT ESOPHAGITIS: Status: ACTIVE | Noted: 2017-08-08

## 2022-01-28 PROBLEM — M25.461 EFFUSION OF BOTH KNEE JOINTS: Status: ACTIVE | Noted: 2019-10-30

## 2022-01-28 PROBLEM — R01.1 HEART MURMUR: Status: ACTIVE | Noted: 2022-01-28

## 2022-01-28 PROBLEM — L98.9: Status: ACTIVE | Noted: 2022-01-28

## 2022-01-28 PROBLEM — D64.9 ANEMIA: Status: ACTIVE | Noted: 2021-11-24

## 2022-01-28 PROBLEM — M25.529 ELBOW PAIN: Status: ACTIVE | Noted: 2019-12-12

## 2022-03-15 ENCOUNTER — LAB REQUISITION (OUTPATIENT)
Dept: LAB | Age: 67
End: 2022-03-15

## 2022-03-15 DIAGNOSIS — E55.9 VITAMIN D DEFICIENCY, UNSPECIFIED: ICD-10-CM

## 2022-03-15 DIAGNOSIS — K21.00 GASTRO-ESOPHAGEAL REFLUX DISEASE WITH ESOPHAGITIS: ICD-10-CM

## 2022-03-15 DIAGNOSIS — Z00.00 ENCOUNTER FOR GENERAL ADULT MEDICAL EXAMINATION WITHOUT ABNORMAL FINDINGS: ICD-10-CM

## 2022-03-15 DIAGNOSIS — K20.0 EOSINOPHILIC ESOPHAGITIS: ICD-10-CM

## 2022-03-15 DIAGNOSIS — E03.9 HYPOTHYROIDISM, UNSPECIFIED: ICD-10-CM

## 2022-03-15 DIAGNOSIS — E78.5 HYPERLIPIDEMIA, UNSPECIFIED: ICD-10-CM

## 2022-03-15 DIAGNOSIS — R73.01 IMPAIRED FASTING GLUCOSE: ICD-10-CM

## 2022-03-18 ENCOUNTER — LAB ENCOUNTER (OUTPATIENT)
Dept: LAB | Facility: HOSPITAL | Age: 67
End: 2022-03-18
Attending: INTERNAL MEDICINE
Payer: MEDICARE

## 2022-03-18 DIAGNOSIS — Z01.818 PREOP TESTING: ICD-10-CM

## 2022-03-19 LAB — SARS-COV-2 RNA RESP QL NAA+PROBE: NOT DETECTED

## 2022-03-21 PROBLEM — R13.10 DYSPHAGIA, UNSPECIFIED: Status: ACTIVE | Noted: 2022-03-21

## 2022-04-12 ENCOUNTER — LAB SERVICES (OUTPATIENT)
Dept: LAB | Age: 67
End: 2022-04-12

## 2022-04-12 LAB
25(OH)D3+25(OH)D2 SERPL-MCNC: 27.3 NG/ML (ref 30–100)
BASOPHILS # BLD: 0 K/MCL (ref 0–0.3)
BASOPHILS NFR BLD: 1 %
DEPRECATED RDW RBC: 48.5 FL (ref 39–50)
EOSINOPHIL # BLD: 0.6 K/MCL (ref 0–0.5)
EOSINOPHIL NFR BLD: 11 %
ERYTHROCYTE [DISTWIDTH] IN BLOOD: 16.5 % (ref 11–15)
HBA1C MFR BLD: 5.7 % (ref 4.5–5.6)
HCT VFR BLD CALC: 40 % (ref 39–51)
HGB BLD-MCNC: 11.6 G/DL (ref 13–17)
IMM GRANULOCYTES # BLD AUTO: 0 K/MCL (ref 0–0.2)
IMM GRANULOCYTES # BLD: 0 %
LYMPHOCYTES # BLD: 1.6 K/MCL (ref 1–4)
LYMPHOCYTES NFR BLD: 29 %
MCH RBC QN AUTO: 23.5 PG (ref 26–34)
MCHC RBC AUTO-ENTMCNC: 29 G/DL (ref 32–36.5)
MCV RBC AUTO: 81 FL (ref 78–100)
MONOCYTES # BLD: 0.6 K/MCL (ref 0.3–0.9)
MONOCYTES NFR BLD: 12 %
NEUTROPHILS # BLD: 2.5 K/MCL (ref 1.8–7.7)
NEUTROPHILS NFR BLD: 47 %
NRBC BLD MANUAL-RTO: 0 /100 WBC
PLATELET # BLD AUTO: 428 K/MCL (ref 140–450)
RBC # BLD: 4.94 MIL/MCL (ref 4.5–5.9)
TSH SERPL-ACNC: 1.4 MCUNITS/ML (ref 0.35–5)
WBC # BLD: 5.3 K/MCL (ref 4.2–11)

## 2022-04-12 PROCEDURE — 80053 COMPREHEN METABOLIC PANEL: CPT | Performed by: CLINICAL MEDICAL LABORATORY

## 2022-04-12 PROCEDURE — 85025 COMPLETE CBC W/AUTO DIFF WBC: CPT | Performed by: CLINICAL MEDICAL LABORATORY

## 2022-04-12 PROCEDURE — 80061 LIPID PANEL: CPT | Performed by: CLINICAL MEDICAL LABORATORY

## 2022-04-12 PROCEDURE — 84439 ASSAY OF FREE THYROXINE: CPT | Performed by: CLINICAL MEDICAL LABORATORY

## 2022-04-12 PROCEDURE — 84443 ASSAY THYROID STIM HORMONE: CPT | Performed by: CLINICAL MEDICAL LABORATORY

## 2022-04-12 PROCEDURE — 82306 VITAMIN D 25 HYDROXY: CPT | Performed by: CLINICAL MEDICAL LABORATORY

## 2022-04-12 PROCEDURE — 83036 HEMOGLOBIN GLYCOSYLATED A1C: CPT | Performed by: CLINICAL MEDICAL LABORATORY

## 2022-04-12 PROCEDURE — 36415 COLL VENOUS BLD VENIPUNCTURE: CPT | Performed by: CLINICAL MEDICAL LABORATORY

## 2022-04-13 LAB
ALBUMIN SERPL-MCNC: 4 G/DL (ref 3.6–5.1)
ALBUMIN/GLOB SERPL: 1.5 {RATIO} (ref 1–2.4)
ALP SERPL-CCNC: 77 UNITS/L (ref 45–117)
ALT SERPL-CCNC: 24 UNITS/L
ANION GAP SERPL CALC-SCNC: 10 MMOL/L (ref 10–20)
AST SERPL-CCNC: 18 UNITS/L
BILIRUB SERPL-MCNC: 1.1 MG/DL (ref 0.2–1)
BUN SERPL-MCNC: 12 MG/DL (ref 6–20)
BUN/CREAT SERPL: 12 (ref 7–25)
CALCIUM SERPL-MCNC: 9.1 MG/DL (ref 8.4–10.2)
CHLORIDE SERPL-SCNC: 108 MMOL/L (ref 98–107)
CHOLEST SERPL-MCNC: 204 MG/DL
CHOLEST/HDLC SERPL: 4.7 {RATIO}
CO2 SERPL-SCNC: 28 MMOL/L (ref 21–32)
CREAT SERPL-MCNC: 0.98 MG/DL (ref 0.67–1.17)
FASTING DURATION TIME PATIENT: ABNORMAL H
FASTING DURATION TIME PATIENT: ABNORMAL H
GFR SERPLBLD BASED ON 1.73 SQ M-ARVRAT: 80 ML/MIN
GLOBULIN SER-MCNC: 2.7 G/DL (ref 2–4)
GLUCOSE SERPL-MCNC: 97 MG/DL (ref 70–99)
HDLC SERPL-MCNC: 43 MG/DL
LDLC SERPL CALC-MCNC: 145 MG/DL
NONHDLC SERPL-MCNC: 161 MG/DL
POTASSIUM SERPL-SCNC: 4.7 MMOL/L (ref 3.4–5.1)
PROT SERPL-MCNC: 6.7 G/DL (ref 6.4–8.2)
SODIUM SERPL-SCNC: 141 MMOL/L (ref 135–145)
T4 FREE SERPL-MCNC: 1.2 NG/DL (ref 0.8–1.5)
TRIGL SERPL-MCNC: 81 MG/DL

## 2022-04-26 ENCOUNTER — LAB REQUISITION (OUTPATIENT)
Dept: LAB | Age: 67
End: 2022-04-26

## 2022-04-26 DIAGNOSIS — Z12.5 ENCOUNTER FOR SCREENING FOR MALIGNANT NEOPLASM OF PROSTATE: ICD-10-CM

## 2022-04-27 ENCOUNTER — LAB SERVICES (OUTPATIENT)
Dept: LAB | Age: 67
End: 2022-04-27

## 2022-04-27 LAB — PSA SERPL-MCNC: 1.07 NG/ML

## 2022-04-27 PROCEDURE — G0103 PSA SCREENING: HCPCS | Performed by: CLINICAL MEDICAL LABORATORY

## 2022-05-02 RX ORDER — ACETAMINOPHEN 500 MG
1000 TABLET ORAL ONCE
Status: CANCELLED | OUTPATIENT
Start: 2022-05-02 | End: 2022-05-02

## 2022-05-02 RX ORDER — MULTIVITAMIN
1 TABLET ORAL DAILY
COMMUNITY

## 2022-05-02 RX ORDER — SODIUM CHLORIDE, SODIUM LACTATE, POTASSIUM CHLORIDE, CALCIUM CHLORIDE 600; 310; 30; 20 MG/100ML; MG/100ML; MG/100ML; MG/100ML
INJECTION, SOLUTION INTRAVENOUS CONTINUOUS
Status: CANCELLED | OUTPATIENT
Start: 2022-05-02

## 2022-05-05 ENCOUNTER — HOSPITAL ENCOUNTER (OUTPATIENT)
Facility: HOSPITAL | Age: 67
Setting detail: HOSPITAL OUTPATIENT SURGERY
Discharge: HOME | End: 2022-05-05
Attending: INTERNAL MEDICINE | Admitting: INTERNAL MEDICINE
Payer: MEDICARE

## 2022-05-05 ENCOUNTER — ANESTHESIA EVENT (OUTPATIENT)
Dept: ENDOSCOPY | Facility: HOSPITAL | Age: 67
End: 2022-05-05
Payer: MEDICARE

## 2022-05-05 ENCOUNTER — ANESTHESIA (OUTPATIENT)
Dept: ENDOSCOPY | Facility: HOSPITAL | Age: 67
End: 2022-05-05
Payer: MEDICARE

## 2022-05-05 VITALS
DIASTOLIC BLOOD PRESSURE: 69 MMHG | RESPIRATION RATE: 16 BRPM | OXYGEN SATURATION: 97 % | HEART RATE: 73 BPM | BODY MASS INDEX: 31.84 KG/M2 | TEMPERATURE: 98 F | SYSTOLIC BLOOD PRESSURE: 119 MMHG | HEIGHT: 69 IN | WEIGHT: 215 LBS

## 2022-05-05 DIAGNOSIS — R13.19 ESOPHAGEAL DYSPHAGIA: ICD-10-CM

## 2022-05-05 DIAGNOSIS — Z01.812 ENCOUNTER FOR PREOPERATIVE SCREENING LABORATORY TESTING FOR COVID-19 VIRUS: Primary | ICD-10-CM

## 2022-05-05 DIAGNOSIS — Z20.822 ENCOUNTER FOR PREOPERATIVE SCREENING LABORATORY TESTING FOR COVID-19 VIRUS: Primary | ICD-10-CM

## 2022-05-05 DIAGNOSIS — Z01.812 ENCOUNTER FOR PREPROCEDURE SCREENING LABORATORY TESTING FOR COVID-19: ICD-10-CM

## 2022-05-05 DIAGNOSIS — Z20.822 ENCOUNTER FOR PREPROCEDURE SCREENING LABORATORY TESTING FOR COVID-19: ICD-10-CM

## 2022-05-05 LAB — SARS-COV-2 RNA RESP QL NAA+PROBE: NOT DETECTED

## 2022-05-05 PROCEDURE — 0D758ZZ DILATION OF ESOPHAGUS, VIA NATURAL OR ARTIFICIAL OPENING ENDOSCOPIC: ICD-10-PCS | Performed by: INTERNAL MEDICINE

## 2022-05-05 PROCEDURE — 0DB58ZX EXCISION OF ESOPHAGUS, VIA NATURAL OR ARTIFICIAL OPENING ENDOSCOPIC, DIAGNOSTIC: ICD-10-PCS | Performed by: INTERNAL MEDICINE

## 2022-05-05 PROCEDURE — 88305 TISSUE EXAM BY PATHOLOGIST: CPT | Performed by: INTERNAL MEDICINE

## 2022-05-05 RX ORDER — SODIUM CHLORIDE, SODIUM LACTATE, POTASSIUM CHLORIDE, CALCIUM CHLORIDE 600; 310; 30; 20 MG/100ML; MG/100ML; MG/100ML; MG/100ML
INJECTION, SOLUTION INTRAVENOUS CONTINUOUS
Status: DISCONTINUED | OUTPATIENT
Start: 2022-05-05 | End: 2022-05-05

## 2022-05-05 RX ORDER — NALOXONE HYDROCHLORIDE 0.4 MG/ML
80 INJECTION, SOLUTION INTRAMUSCULAR; INTRAVENOUS; SUBCUTANEOUS AS NEEDED
Status: DISCONTINUED | OUTPATIENT
Start: 2022-05-05 | End: 2022-05-05

## 2022-05-05 RX ORDER — NICOTINE POLACRILEX 4 MG
30 LOZENGE BUCCAL
Status: DISCONTINUED | OUTPATIENT
Start: 2022-05-05 | End: 2022-05-05

## 2022-05-05 RX ORDER — HYDROMORPHONE HYDROCHLORIDE 1 MG/ML
0.2 INJECTION, SOLUTION INTRAMUSCULAR; INTRAVENOUS; SUBCUTANEOUS EVERY 5 MIN PRN
Status: DISCONTINUED | OUTPATIENT
Start: 2022-05-05 | End: 2022-05-05

## 2022-05-05 RX ORDER — NICOTINE POLACRILEX 4 MG
15 LOZENGE BUCCAL
Status: DISCONTINUED | OUTPATIENT
Start: 2022-05-05 | End: 2022-05-05

## 2022-05-05 RX ORDER — LIDOCAINE HYDROCHLORIDE 10 MG/ML
INJECTION, SOLUTION EPIDURAL; INFILTRATION; INTRACAUDAL; PERINEURAL AS NEEDED
Status: DISCONTINUED | OUTPATIENT
Start: 2022-05-05 | End: 2022-05-05 | Stop reason: SURG

## 2022-05-05 RX ORDER — HYDROMORPHONE HYDROCHLORIDE 1 MG/ML
0.4 INJECTION, SOLUTION INTRAMUSCULAR; INTRAVENOUS; SUBCUTANEOUS EVERY 5 MIN PRN
Status: DISCONTINUED | OUTPATIENT
Start: 2022-05-05 | End: 2022-05-05

## 2022-05-05 RX ORDER — DEXTROSE MONOHYDRATE 25 G/50ML
50 INJECTION, SOLUTION INTRAVENOUS
Status: DISCONTINUED | OUTPATIENT
Start: 2022-05-05 | End: 2022-05-05

## 2022-05-05 RX ORDER — HYDROMORPHONE HYDROCHLORIDE 1 MG/ML
0.6 INJECTION, SOLUTION INTRAMUSCULAR; INTRAVENOUS; SUBCUTANEOUS EVERY 5 MIN PRN
Status: DISCONTINUED | OUTPATIENT
Start: 2022-05-05 | End: 2022-05-05

## 2022-05-05 RX ADMIN — SODIUM CHLORIDE, SODIUM LACTATE, POTASSIUM CHLORIDE, CALCIUM CHLORIDE: 600; 310; 30; 20 INJECTION, SOLUTION INTRAVENOUS at 14:32:00

## 2022-05-05 RX ADMIN — LIDOCAINE HYDROCHLORIDE 25 MG: 10 INJECTION, SOLUTION EPIDURAL; INFILTRATION; INTRACAUDAL; PERINEURAL at 14:14:00

## 2022-05-10 NOTE — PROGRESS NOTES
EGD with bx reports reviewed. Please change GI physician to Dr. Porfirio Torrez.   Maria De Jesus Parnell MD

## 2022-05-11 NOTE — PROGRESS NOTES
Date: 2022    To: Arnaud Polo  : 1955 I hope this letter finds you doing well. I am writing to inform you of the following: The biopsies obtained at the time of your recent endoscopic procedure were benign and showed no evidence of infection or malignancy. Please call the office at [phone] if there are any questions.     Tiffanie Hernandez M.D.

## 2022-05-22 ENCOUNTER — LAB ENCOUNTER (OUTPATIENT)
Dept: LAB | Facility: HOSPITAL | Age: 67
End: 2022-05-22
Attending: INTERNAL MEDICINE
Payer: MEDICARE

## 2022-05-22 DIAGNOSIS — Z01.818 PRE-OP TESTING: ICD-10-CM

## 2022-05-23 LAB — SARS-COV-2 RNA RESP QL NAA+PROBE: NOT DETECTED

## 2022-06-22 ENCOUNTER — HOSPITAL ENCOUNTER (OUTPATIENT)
Facility: HOSPITAL | Age: 67
Setting detail: HOSPITAL OUTPATIENT SURGERY
Discharge: HOME | End: 2022-06-22
Attending: INTERNAL MEDICINE | Admitting: INTERNAL MEDICINE
Payer: MEDICARE

## 2022-06-22 ENCOUNTER — ANESTHESIA (OUTPATIENT)
Dept: ENDOSCOPY | Facility: HOSPITAL | Age: 67
End: 2022-06-22
Payer: MEDICARE

## 2022-06-22 ENCOUNTER — ANESTHESIA EVENT (OUTPATIENT)
Dept: ENDOSCOPY | Facility: HOSPITAL | Age: 67
End: 2022-06-22
Payer: MEDICARE

## 2022-06-22 VITALS
DIASTOLIC BLOOD PRESSURE: 66 MMHG | TEMPERATURE: 98 F | HEART RATE: 75 BPM | OXYGEN SATURATION: 96 % | WEIGHT: 215 LBS | SYSTOLIC BLOOD PRESSURE: 132 MMHG | BODY MASS INDEX: 31.84 KG/M2 | RESPIRATION RATE: 16 BRPM | HEIGHT: 69 IN

## 2022-06-22 DIAGNOSIS — Z01.812 ENCOUNTER FOR PREOPERATIVE SCREENING LABORATORY TESTING FOR COVID-19 VIRUS: Primary | ICD-10-CM

## 2022-06-22 DIAGNOSIS — Z20.822 ENCOUNTER FOR PREOPERATIVE SCREENING LABORATORY TESTING FOR COVID-19 VIRUS: Primary | ICD-10-CM

## 2022-06-22 DIAGNOSIS — K31.7 GASTRIC POLYPS: ICD-10-CM

## 2022-06-22 LAB — SARS-COV-2 RNA RESP QL NAA+PROBE: NOT DETECTED

## 2022-06-22 PROCEDURE — 88305 TISSUE EXAM BY PATHOLOGIST: CPT | Performed by: INTERNAL MEDICINE

## 2022-06-22 PROCEDURE — 0DB58ZX EXCISION OF ESOPHAGUS, VIA NATURAL OR ARTIFICIAL OPENING ENDOSCOPIC, DIAGNOSTIC: ICD-10-PCS | Performed by: INTERNAL MEDICINE

## 2022-06-22 PROCEDURE — 0D738ZZ DILATION OF LOWER ESOPHAGUS, VIA NATURAL OR ARTIFICIAL OPENING ENDOSCOPIC: ICD-10-PCS | Performed by: INTERNAL MEDICINE

## 2022-06-22 RX ORDER — NICOTINE POLACRILEX 4 MG
15 LOZENGE BUCCAL
Status: DISCONTINUED | OUTPATIENT
Start: 2022-06-22 | End: 2022-06-22

## 2022-06-22 RX ORDER — NALOXONE HYDROCHLORIDE 0.4 MG/ML
80 INJECTION, SOLUTION INTRAMUSCULAR; INTRAVENOUS; SUBCUTANEOUS AS NEEDED
Status: DISCONTINUED | OUTPATIENT
Start: 2022-06-22 | End: 2022-06-22

## 2022-06-22 RX ORDER — NICOTINE POLACRILEX 4 MG
30 LOZENGE BUCCAL
Status: DISCONTINUED | OUTPATIENT
Start: 2022-06-22 | End: 2022-06-22

## 2022-06-22 RX ORDER — SODIUM CHLORIDE, SODIUM LACTATE, POTASSIUM CHLORIDE, CALCIUM CHLORIDE 600; 310; 30; 20 MG/100ML; MG/100ML; MG/100ML; MG/100ML
INJECTION, SOLUTION INTRAVENOUS CONTINUOUS
Status: DISCONTINUED | OUTPATIENT
Start: 2022-06-22 | End: 2022-06-22

## 2022-06-22 RX ORDER — DEXTROSE MONOHYDRATE 25 G/50ML
50 INJECTION, SOLUTION INTRAVENOUS
Status: DISCONTINUED | OUTPATIENT
Start: 2022-06-22 | End: 2022-06-22

## 2022-06-22 RX ORDER — ONDANSETRON 2 MG/ML
4 INJECTION INTRAMUSCULAR; INTRAVENOUS AS NEEDED
Status: DISCONTINUED | OUTPATIENT
Start: 2022-06-22 | End: 2022-06-22

## 2022-06-22 NOTE — ANESTHESIA POSTPROCEDURE EVALUATION
[facility]    Geanie Organ Patient Status:  Hospital Outpatient Surgery   Age/Gender 77year old male MRN [id number]   Location [address] [address] Attending Kwabena Gandhi MD   Hosp Day # 0 PCP Leonor Messina       Anesthesia Post-op Note    ESOPHAGOGASTRODUODENOSCOPY with biopsies and dilation to 12mm    Procedure Summary     Date: 06/22/22 Room / Location: [address] [place] [address] ENDOSCOPY 02 / [address] [facility] ENDOSCOPY    Anesthesia Start: 0940 Anesthesia Stop: 3760    Procedure: ESOPHAGOGASTRODUODENOSCOPY with biopsies and dilation to 12mm (N/A ) Diagnosis:       Gastric polyps      (esophageal stricture)    Surgeons: Kwabena Gandhi MD Anesthesiologist: Aggie Cotton MD    Anesthesia Type: MAC ASA Status: 2          Anesthesia Type: MAC    Vitals Value Taken Time   /61 06/22/22 0954   Temp  06/22/22 0954   Pulse 81 06/22/22 0954   Resp 16 06/22/22 0953   SpO2 89 % 06/22/22 0954   Vitals shown include unvalidated device data. Patient Location: Endoscopy    Anesthesia Type: MAC    Airway Patency: patent    Postop Pain Control: adequate    Mental Status: mildly sedated but able to meaningfully participate in the post-anesthesia evaluation    Nausea/Vomiting: none    Cardiopulmonary/Hydration status: stable euvolemic    Complications: no apparent anesthesia related complications    Postop vital signs: stable    Dental Exam: Unchanged from Preop    Patient to be discharged home when criteria met.

## 2022-08-22 ENCOUNTER — ANESTHESIA EVENT (OUTPATIENT)
Dept: ENDOSCOPY | Facility: HOSPITAL | Age: 67
End: 2022-08-22
Payer: MEDICARE

## 2022-08-23 ENCOUNTER — HOSPITAL ENCOUNTER (OUTPATIENT)
Facility: HOSPITAL | Age: 67
Setting detail: HOSPITAL OUTPATIENT SURGERY
Discharge: HOME | End: 2022-08-23
Attending: INTERNAL MEDICINE | Admitting: INTERNAL MEDICINE
Payer: MEDICARE

## 2022-08-23 ENCOUNTER — ANESTHESIA (OUTPATIENT)
Dept: ENDOSCOPY | Facility: HOSPITAL | Age: 67
End: 2022-08-23
Payer: MEDICARE

## 2022-08-23 VITALS
RESPIRATION RATE: 14 BRPM | OXYGEN SATURATION: 99 % | HEART RATE: 67 BPM | HEIGHT: 69 IN | WEIGHT: 215 LBS | DIASTOLIC BLOOD PRESSURE: 74 MMHG | BODY MASS INDEX: 31.84 KG/M2 | SYSTOLIC BLOOD PRESSURE: 133 MMHG | TEMPERATURE: 98 F

## 2022-08-23 DIAGNOSIS — K31.7 GASTRIC POLYPS: ICD-10-CM

## 2022-08-23 DIAGNOSIS — Z20.822 ENCOUNTER FOR PREPROCEDURE SCREENING LABORATORY TESTING FOR COVID-19: Primary | ICD-10-CM

## 2022-08-23 DIAGNOSIS — Z01.812 ENCOUNTER FOR PREPROCEDURE SCREENING LABORATORY TESTING FOR COVID-19: Primary | ICD-10-CM

## 2022-08-23 LAB — SARS-COV-2 RNA RESP QL NAA+PROBE: NOT DETECTED

## 2022-08-23 PROCEDURE — 0D738ZZ DILATION OF LOWER ESOPHAGUS, VIA NATURAL OR ARTIFICIAL OPENING ENDOSCOPIC: ICD-10-PCS | Performed by: INTERNAL MEDICINE

## 2022-08-23 PROCEDURE — 88305 TISSUE EXAM BY PATHOLOGIST: CPT | Performed by: INTERNAL MEDICINE

## 2022-08-23 PROCEDURE — 0DB58ZX EXCISION OF ESOPHAGUS, VIA NATURAL OR ARTIFICIAL OPENING ENDOSCOPIC, DIAGNOSTIC: ICD-10-PCS | Performed by: INTERNAL MEDICINE

## 2022-08-23 RX ORDER — METOCLOPRAMIDE HYDROCHLORIDE 5 MG/ML
10 INJECTION INTRAMUSCULAR; INTRAVENOUS AS NEEDED
Status: DISCONTINUED | OUTPATIENT
Start: 2022-08-23 | End: 2022-08-23

## 2022-08-23 RX ORDER — SODIUM CHLORIDE, SODIUM LACTATE, POTASSIUM CHLORIDE, CALCIUM CHLORIDE 600; 310; 30; 20 MG/100ML; MG/100ML; MG/100ML; MG/100ML
INJECTION, SOLUTION INTRAVENOUS CONTINUOUS
Status: DISCONTINUED | OUTPATIENT
Start: 2022-08-23 | End: 2022-08-23

## 2022-08-23 RX ORDER — DIPHENHYDRAMINE HYDROCHLORIDE 50 MG/ML
12.5 INJECTION INTRAMUSCULAR; INTRAVENOUS AS NEEDED
Status: DISCONTINUED | OUTPATIENT
Start: 2022-08-23 | End: 2022-08-23

## 2022-08-23 RX ORDER — NALOXONE HYDROCHLORIDE 0.4 MG/ML
80 INJECTION, SOLUTION INTRAMUSCULAR; INTRAVENOUS; SUBCUTANEOUS AS NEEDED
Status: DISCONTINUED | OUTPATIENT
Start: 2022-08-23 | End: 2022-08-23

## 2022-08-23 RX ORDER — ONDANSETRON 2 MG/ML
4 INJECTION INTRAMUSCULAR; INTRAVENOUS AS NEEDED
Status: DISCONTINUED | OUTPATIENT
Start: 2022-08-23 | End: 2022-08-23

## 2022-08-23 RX ORDER — LIDOCAINE HYDROCHLORIDE 10 MG/ML
INJECTION, SOLUTION EPIDURAL; INFILTRATION; INTRACAUDAL; PERINEURAL AS NEEDED
Status: DISCONTINUED | OUTPATIENT
Start: 2022-08-23 | End: 2022-08-23 | Stop reason: SURG

## 2022-08-23 RX ORDER — DEXTROSE MONOHYDRATE 25 G/50ML
50 INJECTION, SOLUTION INTRAVENOUS
Status: DISCONTINUED | OUTPATIENT
Start: 2022-08-23 | End: 2022-08-23

## 2022-08-23 RX ORDER — HYDROMORPHONE HYDROCHLORIDE 1 MG/ML
0.4 INJECTION, SOLUTION INTRAMUSCULAR; INTRAVENOUS; SUBCUTANEOUS EVERY 5 MIN PRN
Status: DISCONTINUED | OUTPATIENT
Start: 2022-08-23 | End: 2022-08-23

## 2022-08-23 RX ORDER — NICOTINE POLACRILEX 4 MG
30 LOZENGE BUCCAL
Status: DISCONTINUED | OUTPATIENT
Start: 2022-08-23 | End: 2022-08-23

## 2022-08-23 RX ORDER — NICOTINE POLACRILEX 4 MG
15 LOZENGE BUCCAL
Status: DISCONTINUED | OUTPATIENT
Start: 2022-08-23 | End: 2022-08-23

## 2022-08-23 RX ADMIN — LIDOCAINE HYDROCHLORIDE 100 MG: 10 INJECTION, SOLUTION EPIDURAL; INFILTRATION; INTRACAUDAL; PERINEURAL at 13:13:00

## 2022-08-23 RX ADMIN — SODIUM CHLORIDE, SODIUM LACTATE, POTASSIUM CHLORIDE, CALCIUM CHLORIDE: 600; 310; 30; 20 INJECTION, SOLUTION INTRAVENOUS at 13:13:00

## 2022-08-23 NOTE — ANESTHESIA POSTPROCEDURE EVALUATION
[facility]    Bre Suarez Patient Status:  Hospital Outpatient Surgery   Age/Gender 79year old male MRN [id number]   Location [address] [address] Attending Ilda Marlow MD   Hosp Day # 0 PCP Teri Needs       Anesthesia Post-op Note    ESOPHAGOGASTRODUODENOSCOPY (EGD) with biopsies and balloon dilation 12-13.5-15mm    Procedure Summary     Date: 08/23/22 Room / Location: [address] [place] [address] ENDOSCOPY 02 / 1404 [facility] ENDOSCOPY    Anesthesia Start: 4974 Anesthesia Stop:     Procedure: ESOPHAGOGASTRODUODENOSCOPY (EGD) with biopsies and balloon dilation 12-13.5-15mm (N/A ) Diagnosis:       Gastric polyps      (Esophageal stricture )    Surgeons: Ilda Marlow MD Anesthesiologist: Arturo Lomeli MD    Anesthesia Type: MAC ASA Status: 2          Anesthesia Type: MAC    Vitals Value Taken Time   /68 08/23/22 1325   Temp  08/23/22 1325   Pulse 75 08/23/22 1325   Resp 16 08/23/22 1325   SpO2 95 08/23/22 1325       Patient Location: Endoscopy    Anesthesia Type: MAC    Airway Patency: patent    Postop Pain Control: adequate    Mental Status: mildly sedated but able to meaningfully participate in the post-anesthesia evaluation    Nausea/Vomiting: none    Cardiopulmonary/Hydration status: stable euvolemic    Complications: no apparent anesthesia related complications    Postop vital signs: stable    Dental Exam: Unchanged from Preop    Patient to be discharged home when criteria met.

## 2022-08-28 NOTE — PROGRESS NOTES
Date: 2022    To: Chas Rouse  : 1955     I hope this letter finds you doing well. I am writing to inform you of the following:       Biopsies taken from your esophagus show evidence of a condition called 'eosinophilic esophagitis.'  This is an allergic condition that can cause difficulty swallowing solids and may even result in food becoming stuck in the esophagus. Please call the office at [phone] if there are any questions.     Armida Vargas M.D.

## 2022-09-15 ENCOUNTER — LAB REQUISITION (OUTPATIENT)
Dept: LAB | Age: 67
End: 2022-09-15

## 2022-09-15 DIAGNOSIS — Z00.01 ENCOUNTER FOR GENERAL ADULT MEDICAL EXAMINATION WITH ABNORMAL FINDINGS: ICD-10-CM

## 2022-09-15 DIAGNOSIS — E03.9 HYPOTHYROIDISM, UNSPECIFIED: ICD-10-CM

## 2022-09-15 DIAGNOSIS — E04.2 NONTOXIC MULTINODULAR GOITER: ICD-10-CM

## 2022-09-15 DIAGNOSIS — E55.9 VITAMIN D DEFICIENCY, UNSPECIFIED: ICD-10-CM

## 2022-09-15 DIAGNOSIS — R73.01 IMPAIRED FASTING GLUCOSE: ICD-10-CM

## 2022-09-15 DIAGNOSIS — K20.0 EOSINOPHILIC ESOPHAGITIS: ICD-10-CM

## 2022-09-15 DIAGNOSIS — K21.00 GASTRO-ESOPHAGEAL REFLUX DISEASE WITH ESOPHAGITIS: ICD-10-CM

## 2022-09-15 DIAGNOSIS — E78.5 HYPERLIPIDEMIA, UNSPECIFIED: ICD-10-CM

## 2022-11-28 ENCOUNTER — HOSPITAL ENCOUNTER (OUTPATIENT)
Dept: GENERAL RADIOLOGY | Facility: HOSPITAL | Age: 67
Discharge: HOME | End: 2022-11-28
Attending: PHYSICAL MEDICINE & REHABILITATION
Payer: MEDICARE

## 2022-11-28 ENCOUNTER — TELEPHONE (OUTPATIENT)
Dept: NEUROLOGY | Facility: CLINIC | Age: 67
End: 2022-11-28

## 2022-11-28 ENCOUNTER — OFFICE VISIT (OUTPATIENT)
Dept: PHYSICAL MEDICINE AND REHAB | Facility: CLINIC | Age: 67
End: 2022-11-28
Payer: MEDICARE

## 2022-11-28 VITALS
HEIGHT: 69 IN | BODY MASS INDEX: 31.84 KG/M2 | SYSTOLIC BLOOD PRESSURE: 132 MMHG | WEIGHT: 215 LBS | DIASTOLIC BLOOD PRESSURE: 80 MMHG | HEART RATE: 70 BPM

## 2022-11-28 DIAGNOSIS — M17.10 PRIMARY OSTEOARTHRITIS OF KNEE, UNSPECIFIED LATERALITY: ICD-10-CM

## 2022-11-28 DIAGNOSIS — M22.2X9 PATELLOFEMORAL PAIN SYNDROME, UNSPECIFIED LATERALITY: ICD-10-CM

## 2022-11-28 DIAGNOSIS — M25.562 CHRONIC PAIN OF BOTH KNEES: ICD-10-CM

## 2022-11-28 DIAGNOSIS — G89.29 CHRONIC PAIN OF BOTH KNEES: Primary | ICD-10-CM

## 2022-11-28 DIAGNOSIS — G89.29 CHRONIC PAIN OF BOTH KNEES: ICD-10-CM

## 2022-11-28 DIAGNOSIS — M25.562 CHRONIC PAIN OF BOTH KNEES: Primary | ICD-10-CM

## 2022-11-28 DIAGNOSIS — M25.561 CHRONIC PAIN OF BOTH KNEES: Primary | ICD-10-CM

## 2022-11-28 DIAGNOSIS — M25.561 CHRONIC PAIN OF BOTH KNEES: ICD-10-CM

## 2022-11-28 PROCEDURE — 73564 X-RAY EXAM KNEE 4 OR MORE: CPT | Performed by: PHYSICAL MEDICINE & REHABILITATION

## 2022-11-28 RX ORDER — LIDOCAINE HYDROCHLORIDE 10 MG/ML
18 INJECTION, SOLUTION INFILTRATION; PERINEURAL ONCE
Status: COMPLETED | OUTPATIENT
Start: 2022-11-28 | End: 2022-11-28

## 2022-11-28 RX ORDER — TRIAMCINOLONE ACETONIDE 40 MG/ML
80 INJECTION, SUSPENSION INTRA-ARTICULAR; INTRAMUSCULAR ONCE
Status: COMPLETED | OUTPATIENT
Start: 2022-11-28 | End: 2022-11-28

## 2022-11-28 NOTE — PATIENT INSTRUCTIONS
1) My office will call you to schedule the BILATERAL knee CSI under ultrasound guidance once the procedure is approved by your insurance carrier. 2) Please get X-rays of the BILATERAL knee today on your way out. 3) Tylenol 500-1000 mg every 6-8 hours as needed for pain. No more than 3000 mg daily. 4) Please begin physical therapy as soon as possible. Post Injection Instructions     Please do not do anything strenuous over the next two days (if you had a knee injection do not walk more than 2 city blocks, do not attend any aerobic classes, do not run, no heavy lifting, no prolong standing). You may resume your day to day activities after your injection. You may experience some mild amount of swelling after the procedure. Please ice your joint that was injected at least 5-6 times a day (15 minutes) for two days after (this will help prevent worsening pain that sometimes occurs after an injection). Only take tylenol if needed for pain for the first few days. Watch for signs of infection which include redness, warmth, worsening pain, fevers or chills. If you develop any of these signs call the office immediately at [phone]    Everyone responds differently to injections, but you can expect your peak effects a few weeks after your last injection. Pascale Bernard.  Corey Freitas MD  Physical Medicine and Rehabilitation/Sports Medicine  MEDICAL CENTER [facility]

## 2022-11-28 NOTE — TELEPHONE ENCOUNTER
BILATERAL knee CSI under ultrasound guidance  CPT CODE: 20611x2,   Status: Authorization is not required per health plan however, may be subject to review once claim is submitted  Per Medicare Guidelines; request is a covered benefit and pre-certification is not require. All Medicare coverage is based on Medical Necessity. Notified nursing  for scheduling       .

## 2023-02-06 ENCOUNTER — TELEPHONE (OUTPATIENT)
Dept: PHYSICAL MEDICINE AND REHAB | Facility: CLINIC | Age: 68
End: 2023-02-06

## 2023-02-06 ENCOUNTER — OFFICE VISIT (OUTPATIENT)
Dept: PHYSICAL MEDICINE AND REHAB | Facility: CLINIC | Age: 68
End: 2023-02-06
Payer: MEDICARE

## 2023-02-06 VITALS — WEIGHT: 215 LBS | HEIGHT: 69 IN | BODY MASS INDEX: 31.84 KG/M2

## 2023-02-06 DIAGNOSIS — M25.561 CHRONIC PAIN OF BOTH KNEES: Primary | ICD-10-CM

## 2023-02-06 DIAGNOSIS — G89.29 CHRONIC PAIN OF BOTH KNEES: Primary | ICD-10-CM

## 2023-02-06 DIAGNOSIS — M17.10 PRIMARY OSTEOARTHRITIS OF KNEE, UNSPECIFIED LATERALITY: ICD-10-CM

## 2023-02-06 DIAGNOSIS — M22.2X9 PATELLOFEMORAL PAIN SYNDROME, UNSPECIFIED LATERALITY: ICD-10-CM

## 2023-02-06 DIAGNOSIS — M25.562 CHRONIC PAIN OF BOTH KNEES: Primary | ICD-10-CM

## 2023-02-06 PROCEDURE — 99214 OFFICE O/P EST MOD 30 MIN: CPT | Performed by: PHYSICAL MEDICINE & REHABILITATION

## 2023-02-06 NOTE — PATIENT INSTRUCTIONS
1) Tylenol 500-1000 mg every 6-8 hours as needed for pain. No more than 3000 mg daily. 2) My office will call you to schedule the BILATERAL knee HA and Csi under ultrasound guidance once the procedure is approved by your insurance carrier.   This can be performed after 2/28/23  3) Continue home exercise program and hot tub/ice

## 2023-02-06 NOTE — TELEPHONE ENCOUNTER
Per Medicare Guidelines -no authorization is required for BILATERAL knee Durolane and CSI under ultrasound guidance 08442+   Viscosupplementation with Hyaluronans will only be covered for Osteoarthritis of the knee or shoulder joint when radiological evidence to support the diagnosis of osteoarthritis; and there is adequate documentation that simple pharmacologic therapy (ie aspirin), or exercise and physical therapy has been tried and the patient has failed to respond satisfactorily.  FYI-Synvisc One is limited to Osteoarthritis of the knee    Status: Authorization is not required however may be subject to review once claim is submitted-Covered Benefit (Buy&Bill)

## 2023-02-07 ENCOUNTER — OFFICE VISIT (OUTPATIENT)
Facility: LOCATION | Age: 68
End: 2023-02-07
Payer: MEDICARE

## 2023-02-07 DIAGNOSIS — H61.22 IMPACTED CERUMEN OF LEFT EAR: Primary | ICD-10-CM

## 2023-02-07 DIAGNOSIS — S00.452A FOREIGN BODY OF LEFT EXTERNAL EAR: Primary | ICD-10-CM

## 2023-02-07 PROCEDURE — 92557 COMPREHENSIVE HEARING TEST: CPT | Performed by: AUDIOLOGIST

## 2023-02-07 PROCEDURE — 92504 EAR MICROSCOPY EXAMINATION: CPT | Performed by: OTOLARYNGOLOGY

## 2023-02-07 PROCEDURE — 92567 TYMPANOMETRY: CPT | Performed by: AUDIOLOGIST

## 2023-02-07 PROCEDURE — 99214 OFFICE O/P EST MOD 30 MIN: CPT | Performed by: OTOLARYNGOLOGY

## 2023-03-23 ENCOUNTER — OFFICE VISIT (OUTPATIENT)
Dept: PHYSICAL MEDICINE AND REHAB | Facility: CLINIC | Age: 68
End: 2023-03-23
Payer: MEDICARE

## 2023-03-23 DIAGNOSIS — M25.562 CHRONIC PAIN OF BOTH KNEES: Primary | ICD-10-CM

## 2023-03-23 DIAGNOSIS — M17.10 PRIMARY OSTEOARTHRITIS OF KNEE, UNSPECIFIED LATERALITY: ICD-10-CM

## 2023-03-23 DIAGNOSIS — G89.29 CHRONIC PAIN OF BOTH KNEES: Primary | ICD-10-CM

## 2023-03-23 DIAGNOSIS — M25.561 CHRONIC PAIN OF BOTH KNEES: Primary | ICD-10-CM

## 2023-03-23 DIAGNOSIS — M22.2X9 PATELLOFEMORAL PAIN SYNDROME, UNSPECIFIED LATERALITY: ICD-10-CM

## 2023-03-23 PROCEDURE — 20611 DRAIN/INJ JOINT/BURSA W/US: CPT | Performed by: PHYSICAL MEDICINE & REHABILITATION

## 2023-03-23 RX ORDER — LIDOCAINE HYDROCHLORIDE 10 MG/ML
3 INJECTION, SOLUTION INFILTRATION; PERINEURAL ONCE
Status: COMPLETED | OUTPATIENT
Start: 2023-03-23 | End: 2023-03-23

## 2023-03-23 RX ORDER — LIDOCAINE HYDROCHLORIDE 10 MG/ML
14 INJECTION, SOLUTION INFILTRATION; PERINEURAL ONCE
Status: COMPLETED | OUTPATIENT
Start: 2023-03-23 | End: 2023-03-23

## 2023-03-23 RX ORDER — TRIAMCINOLONE ACETONIDE 40 MG/ML
80 INJECTION, SUSPENSION INTRA-ARTICULAR; INTRAMUSCULAR ONCE
Status: COMPLETED | OUTPATIENT
Start: 2023-03-23 | End: 2023-03-23

## 2023-03-23 RX ADMIN — LIDOCAINE HYDROCHLORIDE 3 ML: 10 INJECTION, SOLUTION INFILTRATION; PERINEURAL at 17:15:00

## 2023-03-23 RX ADMIN — TRIAMCINOLONE ACETONIDE 80 MG: 40 INJECTION, SUSPENSION INTRA-ARTICULAR; INTRAMUSCULAR at 16:30:00

## 2023-03-23 RX ADMIN — LIDOCAINE HYDROCHLORIDE 14 ML: 10 INJECTION, SOLUTION INFILTRATION; PERINEURAL at 16:29:00

## 2023-03-23 NOTE — PATIENT INSTRUCTIONS
Post Injection Instructions     Please do not do anything strenuous over the next two days (if you had a knee injection do not walk more than 2 city blocks, do not attend any aerobic classes, do not run, no heavy lifting, no prolong standing). You may resume your day to day activities after your injection. You may experience some mild amount of swelling after the procedure. Please ice your joint that was injected at least 5-6 times a day (15 minutes) for two days after (this will help prevent worsening pain that sometimes occurs after an injection). Only take tylenol if needed for pain for the first few days. Watch for signs of infection which include redness, warmth, worsening pain, fevers or chills. If you develop any of these signs call the office immediately at [phone]    Everyone responds differently to injections, but you can expect your peak effects a few weeks after your last injection. Tab Gao.  Kati Baugh MD  Physical Medicine and Rehabilitation/Sports Medicine  MEDICAL CENTER [facility]

## 2023-04-18 ENCOUNTER — LAB SERVICES (OUTPATIENT)
Dept: LAB | Age: 68
End: 2023-04-18

## 2023-04-18 LAB
BASOPHILS # BLD: 0.1 K/MCL (ref 0–0.3)
BASOPHILS NFR BLD: 1 %
DEPRECATED RDW RBC: 48.7 FL (ref 39–50)
EOSINOPHIL # BLD: 0.6 K/MCL (ref 0–0.5)
EOSINOPHIL NFR BLD: 10 %
ERYTHROCYTE [DISTWIDTH] IN BLOOD: 16.9 % (ref 11–15)
HBA1C MFR BLD: 5.8 % (ref 4.5–5.6)
HCT VFR BLD CALC: 41.1 % (ref 39–51)
HGB BLD-MCNC: 11.9 G/DL (ref 13–17)
IMM GRANULOCYTES # BLD AUTO: 0 K/MCL (ref 0–0.2)
IMM GRANULOCYTES # BLD: 0 %
LYMPHOCYTES # BLD: 1.6 K/MCL (ref 1–4)
LYMPHOCYTES NFR BLD: 27 %
MCH RBC QN AUTO: 23.3 PG (ref 26–34)
MCHC RBC AUTO-ENTMCNC: 29 G/DL (ref 32–36.5)
MCV RBC AUTO: 80.6 FL (ref 78–100)
MONOCYTES # BLD: 0.6 K/MCL (ref 0.3–0.9)
MONOCYTES NFR BLD: 10 %
NEUTROPHILS # BLD: 3.1 K/MCL (ref 1.8–7.7)
NEUTROPHILS NFR BLD: 52 %
NRBC BLD MANUAL-RTO: 0 /100 WBC
PLATELET # BLD AUTO: 383 K/MCL (ref 140–450)
RBC # BLD: 5.1 MIL/MCL (ref 4.5–5.9)
WBC # BLD: 5.9 K/MCL (ref 4.2–11)

## 2023-04-18 PROCEDURE — G0103 PSA SCREENING: HCPCS | Performed by: CLINICAL MEDICAL LABORATORY

## 2023-04-18 PROCEDURE — 84443 ASSAY THYROID STIM HORMONE: CPT | Performed by: CLINICAL MEDICAL LABORATORY

## 2023-04-18 PROCEDURE — 85025 COMPLETE CBC W/AUTO DIFF WBC: CPT | Performed by: CLINICAL MEDICAL LABORATORY

## 2023-04-18 PROCEDURE — 80061 LIPID PANEL: CPT | Performed by: CLINICAL MEDICAL LABORATORY

## 2023-04-18 PROCEDURE — 80053 COMPREHEN METABOLIC PANEL: CPT | Performed by: CLINICAL MEDICAL LABORATORY

## 2023-04-18 PROCEDURE — 82306 VITAMIN D 25 HYDROXY: CPT | Performed by: CLINICAL MEDICAL LABORATORY

## 2023-04-18 PROCEDURE — 83036 HEMOGLOBIN GLYCOSYLATED A1C: CPT | Performed by: CLINICAL MEDICAL LABORATORY

## 2023-04-18 PROCEDURE — 84439 ASSAY OF FREE THYROXINE: CPT | Performed by: CLINICAL MEDICAL LABORATORY

## 2023-04-19 LAB
25(OH)D3+25(OH)D2 SERPL-MCNC: 41.2 NG/ML (ref 30–100)
ALBUMIN SERPL-MCNC: 3.6 G/DL (ref 3.6–5.1)
ALBUMIN/GLOB SERPL: 1.2 {RATIO} (ref 1–2.4)
ALP SERPL-CCNC: 74 UNITS/L (ref 45–117)
ALT SERPL-CCNC: 23 UNITS/L
ANION GAP SERPL CALC-SCNC: 6 MMOL/L (ref 7–19)
AST SERPL-CCNC: 19 UNITS/L
BILIRUB SERPL-MCNC: 0.9 MG/DL (ref 0.2–1)
BUN SERPL-MCNC: 13 MG/DL (ref 6–20)
BUN/CREAT SERPL: 14 (ref 7–25)
CALCIUM SERPL-MCNC: 8.7 MG/DL (ref 8.4–10.2)
CHLORIDE SERPL-SCNC: 107 MMOL/L (ref 97–110)
CHOLEST SERPL-MCNC: 211 MG/DL
CHOLEST/HDLC SERPL: 4.7 {RATIO}
CO2 SERPL-SCNC: 29 MMOL/L (ref 21–32)
CREAT SERPL-MCNC: 0.92 MG/DL (ref 0.67–1.17)
FASTING DURATION TIME PATIENT: ABNORMAL H
FASTING DURATION TIME PATIENT: ABNORMAL H
GFR SERPLBLD BASED ON 1.73 SQ M-ARVRAT: >90 ML/MIN
GLOBULIN SER-MCNC: 2.9 G/DL (ref 2–4)
GLUCOSE SERPL-MCNC: 107 MG/DL (ref 70–99)
HDLC SERPL-MCNC: 45 MG/DL
LDLC SERPL CALC-MCNC: 150 MG/DL
NONHDLC SERPL-MCNC: 166 MG/DL
POTASSIUM SERPL-SCNC: 4.3 MMOL/L (ref 3.4–5.1)
PROT SERPL-MCNC: 6.5 G/DL (ref 6.4–8.2)
PSA SERPL-MCNC: 1.73 NG/ML
SODIUM SERPL-SCNC: 138 MMOL/L (ref 135–145)
T4 FREE SERPL-MCNC: 1 NG/DL (ref 0.8–1.5)
TRIGL SERPL-MCNC: 78 MG/DL
TSH SERPL-ACNC: 0.76 MCUNITS/ML (ref 0.35–5)

## 2023-04-20 ENCOUNTER — LAB REQUISITION (OUTPATIENT)
Dept: LAB | Age: 68
End: 2023-04-20

## 2023-04-20 DIAGNOSIS — D64.9 ANEMIA, UNSPECIFIED: ICD-10-CM

## 2023-04-21 ENCOUNTER — LAB SERVICES (OUTPATIENT)
Dept: LAB | Age: 68
End: 2023-04-21

## 2023-04-21 PROCEDURE — 83540 ASSAY OF IRON: CPT | Performed by: CLINICAL MEDICAL LABORATORY

## 2023-04-21 PROCEDURE — 83550 IRON BINDING TEST: CPT | Performed by: CLINICAL MEDICAL LABORATORY

## 2023-04-22 LAB
IRON SATN MFR SERPL: 11 % (ref 15–45)
IRON SERPL-MCNC: 50 MCG/DL (ref 65–175)
TIBC SERPL-MCNC: 435 MCG/DL (ref 250–450)

## 2023-04-25 ENCOUNTER — LAB REQUISITION (OUTPATIENT)
Dept: LAB | Age: 68
End: 2023-04-25

## 2023-04-25 DIAGNOSIS — R73.01 IMPAIRED FASTING GLUCOSE: ICD-10-CM

## 2023-04-25 DIAGNOSIS — D50.0 IRON DEFICIENCY ANEMIA SECONDARY TO BLOOD LOSS (CHRONIC): ICD-10-CM

## 2023-06-08 ENCOUNTER — OFFICE VISIT (OUTPATIENT)
Dept: PHYSICAL MEDICINE AND REHAB | Facility: CLINIC | Age: 68
End: 2023-06-08
Payer: MEDICARE

## 2023-06-08 ENCOUNTER — TELEPHONE (OUTPATIENT)
Dept: PHYSICAL MEDICINE AND REHAB | Facility: CLINIC | Age: 68
End: 2023-06-08

## 2023-06-08 VITALS — HEART RATE: 79 BPM | WEIGHT: 215.63 LBS | OXYGEN SATURATION: 97 % | BODY MASS INDEX: 32 KG/M2

## 2023-06-08 DIAGNOSIS — M22.2X9 PATELLOFEMORAL PAIN SYNDROME, UNSPECIFIED LATERALITY: ICD-10-CM

## 2023-06-08 DIAGNOSIS — M25.562 CHRONIC PAIN OF BOTH KNEES: Primary | ICD-10-CM

## 2023-06-08 DIAGNOSIS — M25.561 CHRONIC PAIN OF BOTH KNEES: Primary | ICD-10-CM

## 2023-06-08 DIAGNOSIS — M17.10 PRIMARY OSTEOARTHRITIS OF KNEE, UNSPECIFIED LATERALITY: ICD-10-CM

## 2023-06-08 DIAGNOSIS — G89.29 CHRONIC PAIN OF BOTH KNEES: Primary | ICD-10-CM

## 2023-06-08 PROCEDURE — 99214 OFFICE O/P EST MOD 30 MIN: CPT | Performed by: PHYSICAL MEDICINE & REHABILITATION

## 2023-06-08 NOTE — PATIENT INSTRUCTIONS
1) Continue home exercise program, Ice/Heat  2) Tylenol 500-1000 mg every 6-8 hours as needed for pain. No more than 3000 mg daily. 3) My office will call you to schedule the BILATERAL knee HA and CSI under ultrasound guidance once the procedure is approved by your insurance carrier. This can be performed after 9/23/23  4) My office will call you to schedule the BILATERAL knee CIS under ultrasound guidance once the procedure is approved by your insurance carrier.   This can be performed at any time if your pain returns before September

## 2023-06-08 NOTE — TELEPHONE ENCOUNTER
Per CMS Guidelines -no authorization is required for BILATERAL knee Durolane and CSI under ultrasound guidance CPT 58966-76, E7422q3,    Viscosupplementation with Hyaluronans will only be covered for Osteoarthritis of the knee or shoulder joint when radiological evidence to support the diagnosis of osteoarthritis; and there is adequate documentation that simple pharmacologic therapy (ie aspirin), or exercise and physical therapy has been tried and the patient has failed to respond satisfactorily. FYI-Synvisc One is limited to Osteoarthritis of the knee    Status: Authorization is not required however may be subject to review once claim is submitted-Covered Benefit (Buy&Bill)  Per CMS Guidelines Limitations  The following are considered not reasonable and necessary and therefore will be denied:  -Intra-articular injections of other therapeutic agents, such as corticosteroids, should not be performed in the same knee during the course of viscosupplementation therapy unless there is documented medical necessity (e.g., for documented reactions requiring the use of the additional therapeutic agent).     Per Dr. Emmett Duran schedule after 9/23/23  Last knee Durolane inj done 3/23/23 next due around 9/23/23  Patient already scheduled 9/27/23    AND    Per CMS Guidelines -no authorization is required for BILATERAL knee CSI under ultrasound guidance CPT 51518-60,     Status: Authorization is not required however may be subject to review once claim is submitted-Covered Benefit   LMTCB-to schedule Bilateral knee CSI inj

## 2023-08-10 ENCOUNTER — LAB SERVICES (OUTPATIENT)
Dept: LAB | Age: 68
End: 2023-08-10

## 2023-08-10 LAB
BASOPHILS # BLD: 0.1 K/MCL (ref 0–0.3)
BASOPHILS NFR BLD: 1 %
DEPRECATED RDW RBC: 47.7 FL (ref 39–50)
EOSINOPHIL # BLD: 0.8 K/MCL (ref 0–0.5)
EOSINOPHIL NFR BLD: 13 %
ERYTHROCYTE [DISTWIDTH] IN BLOOD: 15.9 % (ref 11–15)
HBA1C MFR BLD: 5.7 % (ref 4.5–5.6)
HCT VFR BLD CALC: 40.7 % (ref 39–51)
HGB BLD-MCNC: 11.8 G/DL (ref 13–17)
IMM GRANULOCYTES # BLD AUTO: 0 K/MCL (ref 0–0.2)
IMM GRANULOCYTES # BLD: 0 %
IRON SATN MFR SERPL: 13 % (ref 15–45)
IRON SERPL-MCNC: 55 MCG/DL (ref 65–175)
LYMPHOCYTES # BLD: 1.7 K/MCL (ref 1–4)
LYMPHOCYTES NFR BLD: 29 %
MCH RBC QN AUTO: 23.9 PG (ref 26–34)
MCHC RBC AUTO-ENTMCNC: 29 G/DL (ref 32–36.5)
MCV RBC AUTO: 82.4 FL (ref 78–100)
MONOCYTES # BLD: 0.6 K/MCL (ref 0.3–0.9)
MONOCYTES NFR BLD: 11 %
NEUTROPHILS # BLD: 2.7 K/MCL (ref 1.8–7.7)
NEUTROPHILS NFR BLD: 46 %
NRBC BLD MANUAL-RTO: 0 /100 WBC
PLATELET # BLD AUTO: 395 K/MCL (ref 140–450)
RBC # BLD: 4.94 MIL/MCL (ref 4.5–5.9)
TIBC SERPL-MCNC: 433 MCG/DL (ref 250–450)
WBC # BLD: 5.9 K/MCL (ref 4.2–11)

## 2023-08-10 PROCEDURE — 83036 HEMOGLOBIN GLYCOSYLATED A1C: CPT | Performed by: CLINICAL MEDICAL LABORATORY

## 2023-08-10 PROCEDURE — 83550 IRON BINDING TEST: CPT | Performed by: CLINICAL MEDICAL LABORATORY

## 2023-08-10 PROCEDURE — 83540 ASSAY OF IRON: CPT | Performed by: CLINICAL MEDICAL LABORATORY

## 2023-08-10 PROCEDURE — 85025 COMPLETE CBC W/AUTO DIFF WBC: CPT | Performed by: CLINICAL MEDICAL LABORATORY

## 2023-08-16 ENCOUNTER — LAB REQUISITION (OUTPATIENT)
Dept: LAB | Age: 68
End: 2023-08-16

## 2023-08-16 DIAGNOSIS — D50.0 IRON DEFICIENCY ANEMIA SECONDARY TO BLOOD LOSS (CHRONIC): ICD-10-CM

## 2023-09-27 ENCOUNTER — OFFICE VISIT (OUTPATIENT)
Dept: PHYSICAL MEDICINE AND REHAB | Facility: CLINIC | Age: 68
End: 2023-09-27
Payer: MEDICARE

## 2023-09-27 DIAGNOSIS — M25.561 CHRONIC PAIN OF BOTH KNEES: Primary | ICD-10-CM

## 2023-09-27 DIAGNOSIS — M17.10 PRIMARY OSTEOARTHRITIS OF KNEE, UNSPECIFIED LATERALITY: ICD-10-CM

## 2023-09-27 DIAGNOSIS — M25.562 CHRONIC PAIN OF BOTH KNEES: Primary | ICD-10-CM

## 2023-09-27 DIAGNOSIS — M22.2X9 PATELLOFEMORAL PAIN SYNDROME, UNSPECIFIED LATERALITY: ICD-10-CM

## 2023-09-27 DIAGNOSIS — M25.462 EFFUSION OF BOTH KNEE JOINTS: ICD-10-CM

## 2023-09-27 DIAGNOSIS — M25.461 EFFUSION OF BOTH KNEE JOINTS: ICD-10-CM

## 2023-09-27 DIAGNOSIS — G89.29 CHRONIC PAIN OF BOTH KNEES: Primary | ICD-10-CM

## 2023-09-27 PROCEDURE — 20611 DRAIN/INJ JOINT/BURSA W/US: CPT | Performed by: PHYSICAL MEDICINE & REHABILITATION

## 2023-09-27 RX ORDER — TRIAMCINOLONE ACETONIDE 40 MG/ML
80 INJECTION, SUSPENSION INTRA-ARTICULAR; INTRAMUSCULAR ONCE
Status: COMPLETED | OUTPATIENT
Start: 2023-09-27 | End: 2023-09-27

## 2023-09-27 RX ORDER — LIDOCAINE HYDROCHLORIDE 10 MG/ML
14 INJECTION, SOLUTION INFILTRATION; PERINEURAL ONCE
Status: COMPLETED | OUTPATIENT
Start: 2023-09-27 | End: 2023-09-27

## 2023-09-27 NOTE — PATIENT INSTRUCTIONS
Post Injection Instructions     Please do not do anything strenuous over the next two days (if you had a knee injection do not walk more than 2 city blocks, do not attend any aerobic classes, do not run, no heavy lifting, no prolong standing). You may resume your day to day activities after your injection. You may experience some mild amount of swelling after the procedure. Please ice your joint that was injected at least 5-6 times a day (15 minutes) for two days after (this will help prevent worsening pain that sometimes occurs after an injection). Only take tylenol if needed for pain for the first few days. Watch for signs of infection which include redness, warmth, worsening pain, fevers or chills. If you develop any of these signs call the office immediately at [phone]    Everyone responds differently to injections, but you can expect your peak effects a few weeks after your last injection. Mariah Desai.  Aditya Marlow MD  Physical Medicine and Rehabilitation/Sports Medicine  [facility]

## 2023-09-27 NOTE — PROCEDURES
[address]   Knee Joint Injection Procedure Note    CHIEF COMPLAINT:  Patient presents with: Injection: Patient is here for b/l knee durolane and csi under ultrasound guidance. PROCEDURE PERFORMED:  BILATERAL knee intra-articular Durolane and corticosteroid injection under ultrasound guidance    INDICATIONS:  BILATERAL knee pain and osteoarthritis    PRIMARY PROCEDURALIST:  Clinton Braden MD    INFORMED CONSENT & TIME OUT:   As documented in the Time Out and Pre-Procedure Check Lists. Verbal consent was obtained    Vitals: [unfilled]  Labs (document last wbc, plts, hgb, and PT/INR):    No visits with results within 6 Month(s) from this visit. Latest known visit with results is:   Admission on 08/23/2022, Discharged on 08/23/2022   Component Date Value Ref Range Status    Rapid SARS-CoV-2 by PCR 08/23/2022 Not Detected  Not Detected Final    Case Report 08/23/2022    Final                    Value:Surgical Pathology                                Case: [id number]                                   Authorizing Provider:  Jorge A Salazar MD      Collected:           08/23/2022 01:14 PM          Ordering Location:     [facility] Endoscopy  Received:            08/23/2022 03:14 PM                                 Pain Center                                                                  Pathologist:           Alina Degroot MD                                                         Specimen:    Esophagus, Esophagus bxs                                                                   Final Diagnosis: 08/23/2022    Final                    Value: This result contains rich text formatting which cannot be displayed here. Final Diagnosis Comment 08/23/2022    Final                    Value: This result contains rich text formatting which cannot be displayed here. Clinical Information 08/23/2022    Final                    Value: This result contains rich text formatting which cannot be displayed here. Gross Description 08/23/2022    Final                    Value: This result contains rich text formatting which cannot be displayed here. Interpretation 08/23/2022 Benign    Final   ]    PROCEDURE:    LEFT knee:    PROCEDURE:  The procedure, risks, benefits and alternatives were discussed with the patient. Patient verbalized understanding and gave consent. The LEFT knee was prepped and draped in the usual sterile fashion. Using a linear ultrasound probe, the superolateral patella-femoral joint space was visualized. Using a 30-gauge, 1/2-inch needle, 1 cc of 1% lidocaine was used to numb the skin and soft tissues in the superolateral approach. The intra-articular space was then accessed using an 18-gauge, 1-1/2-inch needle, which was visualized on ultrasound, using approximately 5 cc of 1% lidocaine to numb the soft tissue. Once the intra-articular space was visualized on ultrasound, with the needle accessing the space, the syringe was traded for an empty 10 cc syringe and aspiration was attempted. 21 cc of serous straw colored fluid was removed from the LEFT knee. The Durolane  injection was attached to the needle and injected. During the injection, the Durolane was noted to enter the intra-articular space. The Durolane syringe was then switched out for a syringe containing 2 cc of 1% lidocaine and 1 cc of 40 mg/cc triamcinolone which was injected into the same space. The needle was then removed and hemostasis was achieved. Skin was cleansed and a dry dressing was placed. Patient tolerated the procedure well and no immediate complications noted. RIGHT knee:    PROCEDURE:  The procedure, risks, benefits and alternatives were discussed with the patient. Patient verbalized understanding and gave consent. The RIGHT knee was prepped and draped in the usual sterile fashion.  Using a linear ultrasound probe, the superolateral patella-femoral joint space was visualized. Using a 30-gauge, 1/2-inch needle, 1 cc of 1% lidocaine was used to numb the skin and soft tissues in the superolateral approach. The intra-articular space was then accessed using an 18-gauge, 1-1/2-inch needle, which was visualized on ultrasound, using approximately 5 cc of 1% lidocaine to numb the soft tissue. Once the intra-articular space was visualized on ultrasound, with the needle accessing the space, the syringe was traded for an empty 10 cc syringe and aspiration was attempted. 11 cc of serous straw colored fluid was removed from the RIGHT knee. The Durolane injection was attached to the needle and injected. During the injection, the Durolane was noted to enter the intra-articular space. The Durolane syringe was then switched out for a syringe containing 2 cc of 1% lidocaine and 1 cc of 40 mg/cc triamcinolone which was injected into the same space. The needle was then removed and hemostasis was achieved. Skin was cleansed and a dry dressing was placed. Patient tolerated the procedure well and no immediate complications noted. Patient verbalized understanding of assessment and plan. Patient is in agreement with the plan. All questions were answered. No barriers to learning identified. Permanent pictures were saved in our PACS systeme. INSTRUCTIONS GIVEN TO PATIENT:    \"You will see an effect in the next 2-3 days. Please contact me if you have fevers, worsening swelling, worsening pain, decreased range of motion, increased redness, chills, or anything that makes you concerned about how the joint we injected feels/looks. If you do not reach me in a reasonable time, please report directly to the emergency room for further evaluation\"    2 months    Glen Aleman MD, [address] [facility]  Physical Medicine and Rehabilitation/Sports Medicine  [facility]

## 2023-10-12 ENCOUNTER — LAB SERVICES (OUTPATIENT)
Dept: LAB | Age: 68
End: 2023-10-12

## 2023-10-12 LAB
BASOPHILS # BLD: 0.1 K/MCL (ref 0–0.3)
BASOPHILS NFR BLD: 1 %
DEPRECATED RDW RBC: 49.1 FL (ref 39–50)
EOSINOPHIL # BLD: 0.4 K/MCL (ref 0–0.5)
EOSINOPHIL NFR BLD: 6 %
ERYTHROCYTE [DISTWIDTH] IN BLOOD: 16.5 % (ref 11–15)
HCT VFR BLD CALC: 42.5 % (ref 39–51)
HGB BLD-MCNC: 12.7 G/DL (ref 13–17)
IMM GRANULOCYTES # BLD AUTO: 0 K/MCL (ref 0–0.2)
IMM GRANULOCYTES # BLD: 0 %
IRON SATN MFR SERPL: 20 % (ref 15–45)
IRON SERPL-MCNC: 85 MCG/DL (ref 65–175)
LYMPHOCYTES # BLD: 1.5 K/MCL (ref 1–4)
LYMPHOCYTES NFR BLD: 23 %
MCH RBC QN AUTO: 24.7 PG (ref 26–34)
MCHC RBC AUTO-ENTMCNC: 29.9 G/DL (ref 32–36.5)
MCV RBC AUTO: 82.7 FL (ref 78–100)
MONOCYTES # BLD: 0.6 K/MCL (ref 0.3–0.9)
MONOCYTES NFR BLD: 8 %
NEUTROPHILS # BLD: 4.1 K/MCL (ref 1.8–7.7)
NEUTROPHILS NFR BLD: 62 %
NRBC BLD MANUAL-RTO: 0 /100 WBC
PLATELET # BLD AUTO: 422 K/MCL (ref 140–450)
RBC # BLD: 5.14 MIL/MCL (ref 4.5–5.9)
TIBC SERPL-MCNC: 430 MCG/DL (ref 250–450)
WBC # BLD: 6.5 K/MCL (ref 4.2–11)

## 2023-10-12 PROCEDURE — 85025 COMPLETE CBC W/AUTO DIFF WBC: CPT | Performed by: CLINICAL MEDICAL LABORATORY

## 2023-10-12 PROCEDURE — 36415 COLL VENOUS BLD VENIPUNCTURE: CPT | Performed by: CLINICAL MEDICAL LABORATORY

## 2023-10-12 PROCEDURE — 83550 IRON BINDING TEST: CPT | Performed by: CLINICAL MEDICAL LABORATORY

## 2023-10-12 PROCEDURE — 83540 ASSAY OF IRON: CPT | Performed by: CLINICAL MEDICAL LABORATORY

## 2023-10-26 ENCOUNTER — LAB REQUISITION (OUTPATIENT)
Dept: LAB | Age: 68
End: 2023-10-26

## 2023-10-26 DIAGNOSIS — N40.1 BENIGN PROSTATIC HYPERPLASIA WITH LOWER URINARY TRACT SYMPTOMS: ICD-10-CM

## 2023-10-26 DIAGNOSIS — E03.9 HYPOTHYROIDISM, UNSPECIFIED: ICD-10-CM

## 2023-10-26 DIAGNOSIS — E78.5 HYPERLIPIDEMIA, UNSPECIFIED: ICD-10-CM

## 2023-10-26 DIAGNOSIS — K20.0 EOSINOPHILIC ESOPHAGITIS: ICD-10-CM

## 2023-10-26 DIAGNOSIS — D50.0 IRON DEFICIENCY ANEMIA SECONDARY TO BLOOD LOSS (CHRONIC): ICD-10-CM

## 2023-10-26 DIAGNOSIS — R73.01 IMPAIRED FASTING GLUCOSE: ICD-10-CM

## 2023-10-26 DIAGNOSIS — R35.1 NOCTURIA: ICD-10-CM

## 2023-10-26 DIAGNOSIS — E55.9 VITAMIN D DEFICIENCY, UNSPECIFIED: ICD-10-CM

## 2023-10-26 DIAGNOSIS — K21.00 GASTRO-ESOPHAGEAL REFLUX DISEASE WITH ESOPHAGITIS, WITHOUT BLEEDING: ICD-10-CM

## 2023-11-27 ENCOUNTER — TELEPHONE (OUTPATIENT)
Dept: PHYSICAL MEDICINE AND REHAB | Facility: CLINIC | Age: 68
End: 2023-11-27

## 2023-11-27 ENCOUNTER — OFFICE VISIT (OUTPATIENT)
Dept: PHYSICAL MEDICINE AND REHAB | Facility: CLINIC | Age: 68
End: 2023-11-27
Payer: MEDICARE

## 2023-11-27 VITALS — WEIGHT: 215 LBS | BODY MASS INDEX: 31.84 KG/M2 | HEIGHT: 69 IN

## 2023-11-27 DIAGNOSIS — M17.10 PRIMARY OSTEOARTHRITIS OF KNEE, UNSPECIFIED LATERALITY: ICD-10-CM

## 2023-11-27 DIAGNOSIS — M22.2X9 PATELLOFEMORAL PAIN SYNDROME, UNSPECIFIED LATERALITY: Primary | ICD-10-CM

## 2023-11-27 PROCEDURE — 99214 OFFICE O/P EST MOD 30 MIN: CPT | Performed by: PHYSICAL MEDICINE & REHABILITATION

## 2023-11-27 NOTE — TELEPHONE ENCOUNTER
Per CMS Guidelines -no authorization is required for BILATERAL knee CSI under ultrasound guidance CPT 50531-00, I3343r5    Status: Authorization is not required based on medical necessity however may be subject to review once claim is submitted-Covered Benefit     Per Dr. Apryl Sanchez schedule after 12/27/23

## 2023-11-27 NOTE — PATIENT INSTRUCTIONS
1) My office will call you to schedule the BILATERAL knee CSI under ultrasound guidance anytime after 12/23/27 once the procedure is approved by your insurance carrier. 2) Make an appointment with Jesus Saini in [place] for a consultation on knee replacement.   3) We can make a decision on knee injection depending on your timing for the replacement and which side you will be doing

## 2024-01-15 ENCOUNTER — OFFICE VISIT (OUTPATIENT)
Dept: PHYSICAL MEDICINE AND REHAB | Facility: CLINIC | Age: 69
End: 2024-01-15
Payer: MEDICARE

## 2024-01-15 DIAGNOSIS — M22.2X9 PATELLOFEMORAL PAIN SYNDROME, UNSPECIFIED LATERALITY: Primary | ICD-10-CM

## 2024-01-15 DIAGNOSIS — M17.10 PRIMARY OSTEOARTHRITIS OF KNEE, UNSPECIFIED LATERALITY: ICD-10-CM

## 2024-01-15 PROCEDURE — 99213 OFFICE O/P EST LOW 20 MIN: CPT | Performed by: PHYSICAL MEDICINE & REHABILITATION

## 2024-01-15 NOTE — PROGRESS NOTES
[facility]  Progress Note    CHIEF COMPLAINT:    Chief Complaint   Patient presents with    Knee Pain     LOV: 11/27/2023 pt comes in with bilateral knee aching pain, R>L. Denies T/N. Admits weakness. Rates pain 4/10. Takes ibuprofen 600mg PRN. Scheduled for knee replacement consultation 2/13/2024. He is unsure if he would like injection today.       History of Present Illness:  The patient is a 68 year old  male with a significant past medical history of GERD who presents for follow-up of his bilateral knee pain.  He was scheduled for bilateral knee injections today as his pain a 4 out of 10.  He would like to hold off on injections as he would like to have a surgical consultation which she has scheduled for February.  He has been riding a bicycle for about 20 minutes/day and using the hot tub for 20 minutes/day which is helping him loosen up but continues to have discomfort with walking.    PAST MEDICAL HISTORY:  Past Medical History:   Diagnosis Date    Adopted     unknown family history    COVID-19 12/25/2021    Symptoms included: Fever, cough, chills. Hospitalization not required, no lingering symptoms    Disorder of thyroid     Hearing loss     History of cardiac murmur     Pain in joints     Prediabetes     Problems with swallowing     Shortness of breath 2015    Thyroid disease     Visual impairment     glasses       SURGICAL HISTORY:  Past Surgical History:   Procedure Laterality Date    COLONOSCOPY      EGD      TONSILLECTOMY         SOCIAL HISTORY:   Social History     Occupational History    Not on file   Tobacco Use    Smoking status: Never    Smokeless tobacco: Never   Vaping Use    Vaping Use: Never used   Substance and Sexual Activity    Alcohol use: No    Drug use: No    Sexual activity: Not on file       FAMILY HISTORY:   Family History   Family history unknown: Yes       CURRENT MEDICATIONS:   Current Outpatient Medications   Medication Sig Dispense  Refill    Multiple Vitamin (ONE-DAILY MULTI VITAMINS) Oral Tab Take 1 tablet by mouth daily.      aspirin 81 MG Oral Tab EC Take 1 tablet (81 mg total) by mouth daily.      Levothyroxine Sodium 137 MCG Oral Tab TK 1 T PO QD IN THE MORNING OES  1       ALLERGIES:   No Known Allergies    REVIEW OF SYSTEMS:   Review of Systems   Constitutional: Negative.    HENT: Negative.    Eyes: Negative.    Respiratory: Negative.    Cardiovascular: Negative.    Gastrointestinal: Negative.    Genitourinary: Negative.    Musculoskeletal: As per HPI  Skin: Negative.    Neurological: As per HPI  Endo/Heme/Allergies: Negative.    Psychiatric/Behavioral: Negative.      All other systems reviewed and are negative. Pertinent positives and negatives noted in the HPI.        PHYSICAL EXAM:   There were no vitals taken for this visit.    There is no height or weight on file to calculate BMI.      General: No immediate distress  Head: Normocephalic/ Atraumatic  Eyes: Extra-occular movements intact.   Ears: No auricular hematoma or deformities  Mouth: No lesions or ulcerations  Heart: peripheral pulses intact. Normal capillary refill.   Lungs: Non-labored respirations  Abdomen: No abdominal guarding  Extremities: No lower extremity edema bilaterally   Skin: No lesions noted.   Cognition: alert & oriented x 3, attentive, able to follow 2 step commands, comprehention intact, spontaneous speech intact  Motor:    Musculoskeletal:        Data  No visits with results within 6 Month(s) from this visit.   Latest known visit with results is:   Admission on 08/23/2022, Discharged on 08/23/2022   Component Date Value Ref Range Status    Rapid SARS-CoV-2 by PCR 08/23/2022 Not Detected  Not Detected Final    Case Report 08/23/2022    Final                    Value:Surgical Pathology                                Case: [id number]                                   Authorizing Provider:  Denzel Vasquez MD      Collected:           08/23/2022 01:14 PM           Ordering Location:     [facility] Endoscopy  Received:            08/23/2022 03:14 PM                                 Pain Center                                                                  Pathologist:           Vincenzo Damico MD                                                         Specimen:    Esophagus, Esophagus bxs                                                                   Final Diagnosis: 08/23/2022    Final                    Value:This result contains rich text formatting which cannot be displayed here.    Final Diagnosis Comment 08/23/2022    Final                    Value:This result contains rich text formatting which cannot be displayed here.    Clinical Information 08/23/2022    Final                    Value:This result contains rich text formatting which cannot be displayed here.    Gross Description 08/23/2022    Final                    Value:This result contains rich text formatting which cannot be displayed here.    Interpretation 08/23/2022 Benign    Final   ]      Radiology Imaging:  I reviewed with the patient his X-ray of the knees bilateral from 11/28/2022  XR KNEE COMPLETE BILAT EM(CPT=73564-50)  Narrative: PROCEDURE: XR KNEE COMPLETE BILAT EM     COMPARISON: [facility], XR KNEE, COMPLETE (4 OR MORE VIEWS), RIGHT (CPT=73564), 4/15/2020, 12:18 PM.     INDICATIONS: Atraumatic bilateral anterior knee pain x2 years.     TECHNIQUE: Bilateral AP lateral and patellar weight bearing views views were obtained.       FINDINGS:   BONES: No acute fracture dislocation.  Mild-to-moderate bilateral tricompartment osteoarthritis worse on the left.  SOFT TISSUES: Chondrocalcinosis (CPPD) lateral femoral tibial joint compartment left knee.  EFFUSION: Mild left knee joint effusion  OTHER: Negative.               Impression: CONCLUSION:   1. Bilateral mild-moderate tricompartment osteoarthritis, worse on the left.  2. Mild left knee joint effusion.  No acute  fracture dislocation.           Dictated by (CST): Fausto Prescott MD on 11/28/2022 at 2:27 PM       Finalized by (CST): Fausto Prescott MD on 11/28/2022 at 2:30 PM              ASSESSMENT AND PLAN:  Norbert is a pleasant 68-year-old male presents for follow-up of his bilateral knee pain due to osteoarthritis and patellofemoral syndrome.  I am recommending the knee injections although he would like to have an evaluation and consultation with orthopedic surgery.  He has this scheduled for February and he will let us know if he would like to follow through with the injections or if he will proceed with surgery.  He should use Tylenol, glucosamine with chondroitin, and turmeric for pain.       RTC in as needed  Discharge Instructions were provided as documented in AVS summary.  The patient was in agreement with the assessment and plan.  All questions were answered.  There were no barriers to learning.         1. Patellofemoral pain syndrome, unspecified laterality    2. Primary osteoarthritis of knee, unspecified laterality        Alex B. Behar MD  Physical Medicine and Rehabilitation/Sports Medicine  [facility]

## 2024-04-03 ENCOUNTER — LAB SERVICES (OUTPATIENT)
Dept: LAB | Age: 69
End: 2024-04-03

## 2024-04-03 LAB
ALBUMIN SERPL-MCNC: 3.8 G/DL (ref 3.6–5.1)
ALBUMIN/GLOB SERPL: 1.4 {RATIO} (ref 1–2.4)
ALP SERPL-CCNC: 76 UNITS/L (ref 45–117)
ALT SERPL-CCNC: 20 UNITS/L
ANION GAP SERPL CALC-SCNC: 10 MMOL/L (ref 7–19)
AST SERPL-CCNC: 18 UNITS/L
BASOPHILS # BLD: 0 K/MCL (ref 0–0.3)
BASOPHILS NFR BLD: 1 %
BILIRUB SERPL-MCNC: 1.3 MG/DL (ref 0.2–1)
BUN SERPL-MCNC: 11 MG/DL (ref 6–20)
BUN/CREAT SERPL: 13 (ref 7–25)
CALCIUM SERPL-MCNC: 9.2 MG/DL (ref 8.4–10.2)
CHLORIDE SERPL-SCNC: 104 MMOL/L (ref 97–110)
CHOLEST SERPL-MCNC: 239 MG/DL
CHOLEST/HDLC SERPL: 5 {RATIO}
CO2 SERPL-SCNC: 29 MMOL/L (ref 21–32)
CREAT SERPL-MCNC: 0.84 MG/DL (ref 0.67–1.17)
DEPRECATED RDW RBC: 44.1 FL (ref 39–50)
EGFRCR SERPLBLD CKD-EPI 2021: >90 ML/MIN/{1.73_M2}
EOSINOPHIL # BLD: 0.5 K/MCL (ref 0–0.5)
EOSINOPHIL NFR BLD: 9 %
ERYTHROCYTE [DISTWIDTH] IN BLOOD: 14.6 % (ref 11–15)
FASTING DURATION TIME PATIENT: 12 HOURS (ref 0–999)
FOLATE SERPL-MCNC: >24 NG/ML
GLOBULIN SER-MCNC: 2.7 G/DL (ref 2–4)
GLUCOSE SERPL-MCNC: 91 MG/DL (ref 70–99)
HBA1C MFR BLD: 5.7 % (ref 4.5–5.6)
HCT VFR BLD CALC: 42.3 % (ref 39–51)
HDLC SERPL-MCNC: 48 MG/DL
HGB BLD-MCNC: 13.1 G/DL (ref 13–17)
IMM GRANULOCYTES # BLD AUTO: 0 K/MCL (ref 0–0.2)
IMM GRANULOCYTES # BLD: 0 %
LDLC SERPL CALC-MCNC: 173 MG/DL
LYMPHOCYTES # BLD: 1.7 K/MCL (ref 1–4)
LYMPHOCYTES NFR BLD: 28 %
MCH RBC QN AUTO: 25.9 PG (ref 26–34)
MCHC RBC AUTO-ENTMCNC: 31 G/DL (ref 32–36.5)
MCV RBC AUTO: 83.8 FL (ref 78–100)
MONOCYTES # BLD: 0.5 K/MCL (ref 0.3–0.9)
MONOCYTES NFR BLD: 9 %
NEUTROPHILS # BLD: 3.2 K/MCL (ref 1.8–7.7)
NEUTROPHILS NFR BLD: 53 %
NONHDLC SERPL-MCNC: 191 MG/DL
NRBC BLD MANUAL-RTO: 0 /100 WBC
PLATELET # BLD AUTO: 373 K/MCL (ref 140–450)
POTASSIUM SERPL-SCNC: 4.1 MMOL/L (ref 3.4–5.1)
PROT SERPL-MCNC: 6.5 G/DL (ref 6.4–8.2)
PSA SERPL-MCNC: 1.65 NG/ML
RBC # BLD: 5.05 MIL/MCL (ref 4.5–5.9)
SODIUM SERPL-SCNC: 139 MMOL/L (ref 135–145)
TRIGL SERPL-MCNC: 89 MG/DL
VIT B12 SERPL-MCNC: 402 PG/ML (ref 211–911)
WBC # BLD: 6 K/MCL (ref 4.2–11)

## 2024-04-03 PROCEDURE — 82607 VITAMIN B-12: CPT | Performed by: CLINICAL MEDICAL LABORATORY

## 2024-04-03 PROCEDURE — 85025 COMPLETE CBC W/AUTO DIFF WBC: CPT | Performed by: CLINICAL MEDICAL LABORATORY

## 2024-04-03 PROCEDURE — 80061 LIPID PANEL: CPT | Performed by: CLINICAL MEDICAL LABORATORY

## 2024-04-03 PROCEDURE — 82746 ASSAY OF FOLIC ACID SERUM: CPT | Performed by: CLINICAL MEDICAL LABORATORY

## 2024-04-03 PROCEDURE — 83036 HEMOGLOBIN GLYCOSYLATED A1C: CPT | Performed by: CLINICAL MEDICAL LABORATORY

## 2024-04-03 PROCEDURE — 80053 COMPREHEN METABOLIC PANEL: CPT | Performed by: CLINICAL MEDICAL LABORATORY

## 2024-04-03 PROCEDURE — 82652 VIT D 1 25-DIHYDROXY: CPT | Performed by: CLINICAL MEDICAL LABORATORY

## 2024-04-03 PROCEDURE — 84153 ASSAY OF PSA TOTAL: CPT | Performed by: CLINICAL MEDICAL LABORATORY

## 2024-04-05 LAB — VITAMIN D2 SERPL-MCNC: 38.5 PG/ML (ref 19.9–79.3)

## 2024-04-25 ENCOUNTER — OFFICE VISIT (OUTPATIENT)
Dept: PHYSICAL MEDICINE AND REHAB | Facility: CLINIC | Age: 69
End: 2024-04-25
Payer: MEDICARE

## 2024-04-25 VITALS — BODY MASS INDEX: 31.7 KG/M2 | HEIGHT: 69 IN | WEIGHT: 214 LBS

## 2024-04-25 DIAGNOSIS — M25.561 CHRONIC PAIN OF BOTH KNEES: ICD-10-CM

## 2024-04-25 DIAGNOSIS — M25.461 EFFUSION OF BOTH KNEE JOINTS: ICD-10-CM

## 2024-04-25 DIAGNOSIS — M22.2X9 PATELLOFEMORAL PAIN SYNDROME, UNSPECIFIED LATERALITY: Primary | ICD-10-CM

## 2024-04-25 DIAGNOSIS — G89.29 CHRONIC PAIN OF BOTH KNEES: ICD-10-CM

## 2024-04-25 DIAGNOSIS — M25.562 CHRONIC PAIN OF BOTH KNEES: ICD-10-CM

## 2024-04-25 DIAGNOSIS — M25.462 EFFUSION OF BOTH KNEE JOINTS: ICD-10-CM

## 2024-04-25 DIAGNOSIS — M17.10 PRIMARY OSTEOARTHRITIS OF KNEE, UNSPECIFIED LATERALITY: ICD-10-CM

## 2024-04-25 PROCEDURE — 99214 OFFICE O/P EST MOD 30 MIN: CPT | Performed by: PHYSICAL MEDICINE & REHABILITATION

## 2024-04-25 NOTE — PATIENT INSTRUCTIONS
1) We can perform bilateral knee CSI under ultrasound guidance. If you are having a replacement in August, the latest we can do this is the beginning of May  2) We can consider burning the nerves to the bone of the knee joint (Genicular nerve radiofrequency ablation)  3) Can consider formal physical therapy only and see how that goes  4) Continue to plan for knee replacement in August

## 2024-04-25 NOTE — PROGRESS NOTES
[facility]  Progress Note    CHIEF COMPLAINT:    Chief Complaint   Patient presents with    Knee Pain     LOV: 1/15/2024 pt comes in with bilateral knee aching pain. Denies T/N. Admits weakness in bilateral knees. Rates pain 5/10. Takes ibuprofen 600mg PRN. Scheduled for R knee replacement on 8/2024.        History of Present Illness:  The patient is a 68 year old  male with a significant past medical history of GERD who presents for follow-up of his bilateral knee pain.  He was scheduled for bilateral knee injections today as his pain a 5 out of 10.  He has seen orthopedic surgery at [facility] in [place] and is scheduled for right knee replacement in August.  He has been taking ibuprofen 600 mg as needed as well as Excedrin and he does find it to be helpful.  He still does not know if he wants to have any injections to his knees.  He has been doing exercises about every other day and finds that to be helpful.    PAST MEDICAL HISTORY:  Past Medical History:    Adopted    unknown family history    COVID-19    Symptoms included: Fever, cough, chills. Hospitalization not required, no lingering symptoms    Disorder of thyroid    Hearing loss    History of cardiac murmur    Pain in joints    Prediabetes    Problems with swallowing    Shortness of breath    Thyroid disease    Visual impairment    glasses       SURGICAL HISTORY:  Past Surgical History:   Procedure Laterality Date    Colonoscopy      Egd      Tonsillectomy         SOCIAL HISTORY:   Social History     Occupational History    Not on file   Tobacco Use    Smoking status: Never    Smokeless tobacco: Never   Vaping Use    Vaping status: Never Used   Substance and Sexual Activity    Alcohol use: No    Drug use: No    Sexual activity: Not on file       FAMILY HISTORY:   Family History   Family history unknown: Yes       CURRENT MEDICATIONS:   Current Outpatient Medications   Medication Sig Dispense Refill    Multiple  Vitamin (ONE-DAILY MULTI VITAMINS) Oral Tab Take 1 tablet by mouth daily.      aspirin 81 MG Oral Tab EC Take 1 tablet (81 mg total) by mouth daily.      Levothyroxine Sodium 137 MCG Oral Tab TK 1 T PO QD IN THE MORNING OES  1       ALLERGIES:   Allergies   Allergen Reactions    Seasonal OTHER (SEE COMMENTS)     seasonal       REVIEW OF SYSTEMS:   Review of Systems   Constitutional: Negative.    HENT: Negative.    Eyes: Negative.    Respiratory: Negative.    Cardiovascular: Negative.    Gastrointestinal: Negative.    Genitourinary: Negative.    Musculoskeletal: As per HPI  Skin: Negative.    Neurological: As per HPI  Endo/Heme/Allergies: Negative.    Psychiatric/Behavioral: Negative.      All other systems reviewed and are negative. Pertinent positives and negatives noted in the HPI.        PHYSICAL EXAM:   Ht 69\"   Wt 214 lb (97.1 kg)   BMI 31.60 kg/m²     Body mass index is 31.6 kg/m².      General: No immediate distress  Head: Normocephalic/ Atraumatic  Eyes: Extra-occular movements intact.   Ears: No auricular hematoma or deformities  Mouth: No lesions or ulcerations  Heart: peripheral pulses intact. Normal capillary refill.   Lungs: Non-labored respirations  Abdomen: No abdominal guarding  Extremities: No lower extremity edema bilaterally   Skin: No lesions noted.   Cognition: alert & oriented x 3, attentive, able to follow 2 step commands, comprehention intact, spontaneous speech intact  Motor:    Musculoskeletal:        Data  No visits with results within 6 Month(s) from this visit.   Latest known visit with results is:   Admission on 08/23/2022, Discharged on 08/23/2022   Component Date Value Ref Range Status    Rapid SARS-CoV-2 by PCR 08/23/2022 Not Detected  Not Detected Final    Case Report 08/23/2022    Final                    Value:Surgical Pathology                                Case: [id number]                                   Authorizing Provider:  Denzel Vasquez MD      Collected:            08/23/2022 01:14 PM          Ordering Location:     [facility] Endoscopy  Received:            08/23/2022 03:14 PM                                 Pain Center                                                                  Pathologist:           Vincenzo Damico MD                                                         Specimen:    Esophagus, Esophagus bxs                                                                   Final Diagnosis: 08/23/2022    Final                    Value:This result contains rich text formatting which cannot be displayed here.    Final Diagnosis Comment 08/23/2022    Final                    Value:This result contains rich text formatting which cannot be displayed here.    Clinical Information 08/23/2022    Final                    Value:This result contains rich text formatting which cannot be displayed here.    Gross Description 08/23/2022    Final                    Value:This result contains rich text formatting which cannot be displayed here.    Interpretation 08/23/2022 Benign    Final   ]      Radiology Imaging:  I reviewed with the patient his X-ray of the knees bilateral   XR KNEE COMPLETE BILAT EM(CPT=73564-50)  Narrative: PROCEDURE: XR KNEE COMPLETE BILAT EM     COMPARISON: [facility], XR KNEE, COMPLETE (4 OR MORE VIEWS), RIGHT (CPT=73564), 4/15/2020, 12:18 PM.     INDICATIONS: Atraumatic bilateral anterior knee pain x2 years.     TECHNIQUE: Bilateral AP lateral and patellar weight bearing views views were obtained.       FINDINGS:   BONES: No acute fracture dislocation.  Mild-to-moderate bilateral tricompartment osteoarthritis worse on the left.  SOFT TISSUES: Chondrocalcinosis (CPPD) lateral femoral tibial joint compartment left knee.  EFFUSION: Mild left knee joint effusion  OTHER: Negative.               Impression: CONCLUSION:   1. Bilateral mild-moderate tricompartment osteoarthritis, worse on the left.  2. Mild left knee joint effusion.   No acute fracture dislocation.           Dictated by (CST): Fausto Prescott MD on 11/28/2022 at 2:27 PM       Finalized by (CST): Fausto Prescott MD on 11/28/2022 at 2:30 PM              ASSESSMENT AND PLAN:  Bennie is a pleasant 68-year-old male who presents for follow-up of his bilateral knee pain due to osteoarthritis and patellofemoral syndrome with joint effusions.  I have had a long discussion with him regarding nonoperative treatments which may include repeating hyaluronic acid, corticosteroid injections, or physical therapy.  We have also discussed genicular nerve ablations.  He would like to think about his options and still consider a knee replacement in August.  He will let us know which way he would like to proceed.  The meantime, he can continue using Tylenol and ibuprofen as well as Excedrin if needed.  He should also use ice and continue exercises       RTC in as needed depending on procedure he would like to have performed  Discharge Instructions were provided as documented in AVS summary.  The patient was in agreement with the assessment and plan.  All questions were answered.  There were no barriers to learning.         1. Patellofemoral pain syndrome, unspecified laterality    2. Chronic pain of both knees    3. Effusion of both knee joints    4. Primary osteoarthritis of knee, unspecified laterality        Alex B. Behar MD  Physical Medicine and Rehabilitation/Sports Medicine  [facility]

## 2024-11-29 RX ORDER — PANTOPRAZOLE SODIUM 40 MG/1
40 TABLET, DELAYED RELEASE ORAL
COMMUNITY

## 2024-12-19 ENCOUNTER — ANESTHESIA EVENT (OUTPATIENT)
Dept: ENDOSCOPY | Facility: HOSPITAL | Age: 69
End: 2024-12-19
Payer: MEDICARE

## 2024-12-19 NOTE — ANESTHESIA PREPROCEDURE EVALUATION
PRE-OP EVALUATION    Patient Name: Bennie Yousif    Admit Diagnosis: STRICTURE ESOPHAGUS    Pre-op Diagnosis: STRICTURE ESOPHAGUS    ESOPHAGOGASTRODUODENOSCOPY (EGD) WITH CRYOTHERAPY    Anesthesia Procedure: ESOPHAGOGASTRODUODENOSCOPY (EGD) WITH CRYOTHERAPY    Surgeons and Role:     * Maxi Presley MD - Primary    Pre-op vitals reviewed.        Body mass index is 31.75 kg/m².    Current medications reviewed.  Hospital Medications:  No current facility-administered medications on file as of .       Outpatient Medications:   Prescriptions Prior to Admission[1]    Allergies: Seasonal      Anesthesia Evaluation    Patient summary reviewed.    Anesthetic Complications           GI/Hepatic/Renal  Comment: dysphagia  Negative GI/hepatic/renal ROS.  (+) GERD                           Cardiovascular    Negative cardiovascular ROS.        MET: >4                                           Endo/Other    Negative endo/other ROS.  (+) diabetes     (+) hypothyroidism    (+) anemia                   Pulmonary    Negative pulmonary ROS.           (+) shortness of breath            Neuro/Psych    Negative neuro/psych ROS.                                  Past Surgical History:   Procedure Laterality Date    Colonoscopy      Egd      multiple times    Tonsillectomy      Total knee replacement Right 08/22/2024     Social History     Socioeconomic History    Marital status:    Tobacco Use    Smoking status: Never    Smokeless tobacco: Never   Vaping Use    Vaping status: Never Used   Substance and Sexual Activity    Alcohol use: No    Drug use: No   Other Topics Concern    Caffeine Concern Yes     Comment: 3 -4 cups of coffee per day    Exercise Yes     Comment: several times per day     History   Drug Use No     Available pre-op labs reviewed.               Airway      Mallampati: II  Mouth opening: >3 FB  TM distance: > 6 cm  Neck ROM: full Cardiovascular    Cardiovascular exam normal.         Dental  Comment:  No loose, chipped, cracked or missing teeth per patient            Pulmonary    Pulmonary exam normal.                 Other findings              ASA: 2   Plan: MAC  NPO status verified and         Comment:   Plan is MAC anesthesia, which likely will include deep sedation.  Implied that memory of procedure is unlikely although intraop recall, if it occurs, may be a reasonable and comfortable experience with this anesthetic.  Aware that general anesthesia is not intended though deep sedation may include brief moments of general anesthesia.   Questions answered. Accepts. The consent was signed without further questions.   Plan/risks discussed with: patient                Present on Admission:  **None**             [1]   No medications prior to admission.

## 2024-12-20 ENCOUNTER — HOSPITAL ENCOUNTER (OUTPATIENT)
Facility: HOSPITAL | Age: 69
Setting detail: HOSPITAL OUTPATIENT SURGERY
Discharge: HOME | End: 2024-12-20
Attending: INTERNAL MEDICINE | Admitting: INTERNAL MEDICINE
Payer: MEDICARE

## 2024-12-20 ENCOUNTER — ANESTHESIA (OUTPATIENT)
Dept: ENDOSCOPY | Facility: HOSPITAL | Age: 69
End: 2024-12-20
Payer: MEDICARE

## 2024-12-20 VITALS
HEIGHT: 69 IN | BODY MASS INDEX: 31.84 KG/M2 | TEMPERATURE: 98 F | OXYGEN SATURATION: 100 % | RESPIRATION RATE: 18 BRPM | HEART RATE: 68 BPM | WEIGHT: 215 LBS | DIASTOLIC BLOOD PRESSURE: 77 MMHG | SYSTOLIC BLOOD PRESSURE: 136 MMHG

## 2024-12-20 PROCEDURE — 3E0G8GC INTRODUCTION OF OTHER THERAPEUTIC SUBSTANCE INTO UPPER GI, VIA NATURAL OR ARTIFICIAL OPENING ENDOSCOPIC: ICD-10-PCS | Performed by: INTERNAL MEDICINE

## 2024-12-20 PROCEDURE — 0D738ZZ DILATION OF LOWER ESOPHAGUS, VIA NATURAL OR ARTIFICIAL OPENING ENDOSCOPIC: ICD-10-PCS | Performed by: INTERNAL MEDICINE

## 2024-12-20 RX ORDER — NICOTINE POLACRILEX 4 MG
15 LOZENGE BUCCAL
Status: DISCONTINUED | OUTPATIENT
Start: 2024-12-20 | End: 2024-12-20

## 2024-12-20 RX ORDER — DEXTROSE MONOHYDRATE 25 G/50ML
50 INJECTION, SOLUTION INTRAVENOUS
Status: DISCONTINUED | OUTPATIENT
Start: 2024-12-20 | End: 2024-12-20

## 2024-12-20 RX ORDER — HYDROMORPHONE HYDROCHLORIDE 1 MG/ML
0.2 INJECTION, SOLUTION INTRAMUSCULAR; INTRAVENOUS; SUBCUTANEOUS EVERY 5 MIN PRN
Status: DISCONTINUED | OUTPATIENT
Start: 2024-12-20 | End: 2024-12-20

## 2024-12-20 RX ORDER — ONDANSETRON 2 MG/ML
4 INJECTION INTRAMUSCULAR; INTRAVENOUS EVERY 6 HOURS PRN
Status: DISCONTINUED | OUTPATIENT
Start: 2024-12-20 | End: 2024-12-20

## 2024-12-20 RX ORDER — NALOXONE HYDROCHLORIDE 0.4 MG/ML
0.08 INJECTION, SOLUTION INTRAMUSCULAR; INTRAVENOUS; SUBCUTANEOUS AS NEEDED
Status: DISCONTINUED | OUTPATIENT
Start: 2024-12-20 | End: 2024-12-20

## 2024-12-20 RX ORDER — ACETAMINOPHEN 500 MG
1000 TABLET ORAL ONCE AS NEEDED
Status: DISCONTINUED | OUTPATIENT
Start: 2024-12-20 | End: 2024-12-20

## 2024-12-20 RX ORDER — HYDROCODONE BITARTRATE AND ACETAMINOPHEN 5; 325 MG/1; MG/1
2 TABLET ORAL ONCE AS NEEDED
Status: DISCONTINUED | OUTPATIENT
Start: 2024-12-20 | End: 2024-12-20

## 2024-12-20 RX ORDER — HYDROCODONE BITARTRATE AND ACETAMINOPHEN 5; 325 MG/1; MG/1
1 TABLET ORAL ONCE AS NEEDED
Status: DISCONTINUED | OUTPATIENT
Start: 2024-12-20 | End: 2024-12-20

## 2024-12-20 RX ORDER — TRIAMCINOLONE ACETONIDE 40 MG/ML
INJECTION, SUSPENSION INTRA-ARTICULAR; INTRAMUSCULAR
Status: DISCONTINUED | OUTPATIENT
Start: 2024-12-20 | End: 2024-12-20

## 2024-12-20 RX ORDER — NICOTINE POLACRILEX 4 MG
30 LOZENGE BUCCAL
Status: DISCONTINUED | OUTPATIENT
Start: 2024-12-20 | End: 2024-12-20

## 2024-12-20 RX ORDER — SODIUM CHLORIDE, SODIUM LACTATE, POTASSIUM CHLORIDE, CALCIUM CHLORIDE 600; 310; 30; 20 MG/100ML; MG/100ML; MG/100ML; MG/100ML
INJECTION, SOLUTION INTRAVENOUS CONTINUOUS
Status: DISCONTINUED | OUTPATIENT
Start: 2024-12-20 | End: 2024-12-20

## 2024-12-20 RX ORDER — METOCLOPRAMIDE HYDROCHLORIDE 5 MG/ML
10 INJECTION INTRAMUSCULAR; INTRAVENOUS EVERY 8 HOURS PRN
Status: DISCONTINUED | OUTPATIENT
Start: 2024-12-20 | End: 2024-12-20

## 2024-12-20 RX ORDER — HYDROMORPHONE HYDROCHLORIDE 1 MG/ML
0.6 INJECTION, SOLUTION INTRAMUSCULAR; INTRAVENOUS; SUBCUTANEOUS EVERY 5 MIN PRN
Status: DISCONTINUED | OUTPATIENT
Start: 2024-12-20 | End: 2024-12-20

## 2024-12-20 RX ORDER — HYDROMORPHONE HYDROCHLORIDE 1 MG/ML
0.4 INJECTION, SOLUTION INTRAMUSCULAR; INTRAVENOUS; SUBCUTANEOUS EVERY 5 MIN PRN
Status: DISCONTINUED | OUTPATIENT
Start: 2024-12-20 | End: 2024-12-20

## 2024-12-20 NOTE — ANESTHESIA POSTPROCEDURE EVALUATION
[facility]    Bennie Yousif Patient Status:  Hospital Outpatient Surgery   Age/Gender 69 year old male MRN [id number]   Location [facility] Attending Maxi Presley MD   Hosp Day # 0 PCP None Pcp       Anesthesia Post-op Note    ESOPHAGOGASTRODUODENOSCOPY (EGD) W/ CRYOTHERAPY, SUBMUCOSAL KENALOG INJECTION, & ESOPHAGEAL DILATION TO 12MM    Procedure Summary       Date: 12/20/24 Room / Location:  ENDOSCOPY 03 / [facility]    Anesthesia Start: 1108 Anesthesia Stop: 1142    Procedure: ESOPHAGOGASTRODUODENOSCOPY (EGD) W/ CRYOTHERAPY, SUBMUCOSAL KENALOG INJECTION, & ESOPHAGEAL DILATION TO 12MM Diagnosis: (ESOPHAGEAL STRICTURE)    Surgeons: Maxi Presley MD Anesthesiologist: Porfirio Huitron DO    Anesthesia Type: MAC ASA Status: 2            Anesthesia Type: No value filed.    Vitals Value Taken Time   /70 12/20/24 1148   Temp 97  12/20/24 1149   Pulse 74 12/20/24 1148   Resp 16 12/20/24 1142   SpO2 96 % 12/20/24 1148   Vitals shown include unfiled device data.    Patient Location: Endoscopy    Anesthesia Type: MAC    Airway Patency: patent    Postop Pain Control: adequate    Mental Status: preanesthetic baseline    Nausea/Vomiting: none    Cardiopulmonary/Hydration status: stable euvolemic    Complications: no apparent anesthesia related complications    Postop vital signs: stable    Dental Exam: Unchanged from Preop

## 2024-12-20 NOTE — H&P
History & Physical Examination    Patient Name: Bennie Yousif  MRN: [id number]  CSN: [id number]  YOB: 1955    Diagnosis: STRICTURE ESOPHAGUS      Present Illness:  Bennie Yousif is a 69 year old male is here STRICTURE ESOPHAGUS.    Body mass index is 31.75 kg/m².    Past Medical History:    Adopted    unknown family history    COVID-19    Symptoms included: Fever, cough, chills. Hospitalization not required, no lingering symptoms    Disorder of thyroid    Hearing loss    History of cardiac murmur    Pain in joints    Prediabetes    Problems with swallowing    Shortness of breath    Thyroid disease    Visual impairment    glasses       Procedure: edg    Physician Pre-Sedation Assessment    Pre-Sedation Assessment:    Sedation History: Airway Assessed    ASA Classification: 2. Patient with mild systemic disease    Cardiac:    Respiratory:    Abdomen:      Plan: MAC        No current facility-administered medications for this encounter.       Allergies: Allergies[1]    Past Surgical History:   Procedure Laterality Date    Colonoscopy      Egd      multiple times    Tonsillectomy      Total knee replacement Right 08/22/2024     Family History   Family history unknown: Yes     Social History     Tobacco Use    Smoking status: Never    Smokeless tobacco: Never   Substance Use Topics    Alcohol use: No       SYSTEM Check if Review is Normal Check if Physical Exam is Normal If not normal, please explain:   HEENT [x ] [x ]    NECK & BACK [x ] [x ]    HEART [x ] [x ]    LUNGS [x ] [x ]    ABDOMEN [x ] [x ]    UROGENITAL [ ] [ ]    EXTREMITIES [ ] [ ]    OTHER        [ x ] I have discussed the risks and benefits and alternatives with the patient/family.  They understand and agree to proceed with plan of care.  [ x ] I have reviewed the History and Physical done within the last 30 days.  Any changes noted above.    Maxi Presley MD  12/20/2024  3:16 PM             [1]   Allergies  Allergen  Reactions    Seasonal OTHER (SEE COMMENTS)     seasonal

## 2024-12-20 NOTE — DISCHARGE INSTRUCTIONS
Home Care Instructions for EGD With Sedation    Diet:  .  - start with soft diet - per dr Presley  - Do not drink alcohol today.    Medication:  - If you have questions about resuming your normal medications, please contact your Primary Care Physician.    Activities:  - Take it easy today. Do not return to work today.  - Do not drive today.  - Do not operate any machinery today (including kitchen equipment).      EGD:  - You may have a sore throat for 2-3 days following the exam. This is normal. Gargling with warm salt water (1/2 tsp salt to 1 glass warm water) or using throat lozenges will help.  - If you experience any sharp pain in your neck, abdomen or chest, vomiting of blood, oral temperature over 100 degrees Farenheit, light-headedness or dizziness, or any other problems, contact your doctor.    **If unable to reach your doctor, please go to the [facility]**    - Your referring physician will receive a full report of your examination.  - If you do not hear from your doctor's office within two weeks of your biopsy, please call them for your results.    Additional Comments/Instructions (if applicable):

## 2024-12-20 NOTE — OPERATIVE REPORT
OPERATIVE REPORT   PATIENT NAME: Bennie Yousif  MRN: [id number]  DATE OF OPERATION: 12/20/2024  PREOPERATIVE DIAGNOSIS: History of refractory esophageal stricture with a history of eosinophilic esophagitis.  In the past patient has not lasted more than 6 months after balloon dilation.  On my last endoscopy I was able to dilate his distal esophagus to 11 mm.  Patient's dysphagia has improved significantly and remains improved.  We had discussed making his esophagus from re-constricting in the bed strategy would be for cryo dilation; patient had consented for this and proceed with a cryotherapy  POSTOPERATIVE DIAGNOSIS:    1.  Distal esophageal strictures with rings through which the scope was able to traverse   2.  Successful circumferential cryotherapy for the distal 3 cm esophagus followed by balloon dilation from 10 mm to 12 mm with successful mucosal tear followed by 40 mg of Kenalog submucosal injection   3.  Incidental upper esophageal tear noted after passage of scope with a distal attachment  PROCEDURE PERFORMED: Esophagogastroduodenoscopy with cryoablation, balloon dilation and submucosal injection  SEDATION MEDICATIONS: MAC  MODERATE SEDATION TIME: None.  Deep sedation provided by anesthesia  PREPROCEDURE ASSESSMENT: The indication for this procedure is to assess for prior dilation. The patient was identified by myself and nursing staff in the exam room. Informed consent was obtained. The patient was seen in clinic and a full H&P was obtained. On brief physical examination, airway is patent. Chest is clear. Heart has regular rate and rhythm. Abdomen is soft, nontender with good bowel sounds. A medication list was taken by nursing today and reviewed by myself. The patient is an ASA grade 2.   PROCEDURE NOTE: The procedure was completed without difficulty. The patient tolerated the procedure well. The endoscope was inserted through the mouth and advanced to the level of the duodenum, 3rd portion.   Esophagus appeared normal without stricture or esophagitis.  Within the distal esophagus there were 2 rings through which the scope was able to traverse.  In the past scope was not able to traverse.  There was significant narrowing to the distal esophagus.  Cryotherapy was performed circumferentially for the distal 3 cm of the esophagus (see details below).  After cryotherapy, balloon dilation was performed starting with 10 mm.  There was no resistance and the balloon was carefully and sequentially increased to 11 mm and then 12 mm.  At 12 mm there was a large 2 cm long mucosal tear noted.  There was no bleeding or perforation seen.  40 mg of Kenalog was dispersed at the level of each areas of the stricturing in the distal esophagus.  For cryotherapy, a distal attachment was placed at the end of the scope that caused a mild mucosal tear in the upper esophagus.  No bleeding was seen.  No hiatal hernia or Solano's esophagus was noted.  Stomach was normal in the antrum and the body.  Duodenum was normal in the bulb and 2nd duodenum.  Retroflexed view in the stomach revealed normal fundus and cardia.  There were no immediate complications.   Cryotherapy procedure:  All fluid was removed from the stomach. A 260cm 035\" guidewire was placed through the scope and left into the jejunum.  The scope was removed leaving the guidewire in place. A CDT tube was passed over the guidewire and the scope was advanced next to it. The 4 markings on the catheter were noted at the GE junction- 2 marking were below the GEJ and 2 were above the GEJ. Tube was secured with tape.  Suction was initiated. With the stricture in position, the cryo catheter was passed through the scope.  3 sessions of cryo were applied to the entire 2cm segments each at 20 seconds.and allowing 60sec for thawing. At the end all area appeared treated and scope and CDT was removed.  No immediate complications were noted.    FINDINGS   Successful cryo-dilation  followed by balloon dilation and Kenalog injection  RECOMMENDATIONS: I recommended repeat endoscopy and cryo-dilation in 4 to 6 weeks but patient would rather call if dysphagia returns.  I will see him in the office in 6 months.  Continue daily PPI therapy.  Soft diet was advised today.  DISCHARGE:  On discharge, the patient was given an after-visit summary detailing the procedure, findings, followup plans, and an updated medication list.     Thank you very much for the consultation.  I really appreciate it.    Maxi Presley MD

## 2025-06-17 ENCOUNTER — OFFICE VISIT (OUTPATIENT)
Facility: LOCATION | Age: 70
End: 2025-06-17
Payer: MEDICARE

## 2025-06-17 DIAGNOSIS — H61.23 BILATERAL IMPACTED CERUMEN: Primary | ICD-10-CM

## 2025-06-17 PROCEDURE — 92504 EAR MICROSCOPY EXAMINATION: CPT | Performed by: OTOLARYNGOLOGY

## 2025-06-17 PROCEDURE — 99214 OFFICE O/P EST MOD 30 MIN: CPT | Performed by: OTOLARYNGOLOGY

## 2025-06-17 NOTE — PROGRESS NOTES
Bennie Yousif is a 69 year old male. No chief complaint on file.    HPI:   69-year-old male calving clicking sensation left right tried drops that did help somewhat he has had problems with both wax and hair in the past.  Current Medications[1]   Past Medical History[2]   Social History:  Short Social Hx on File[3]   Past Surgical History[4]      REVIEW OF SYSTEMS:   GENERAL HEALTH: feels well otherwise  GENERAL : denies fever, chills, sweats, weight loss, weight gain  SKIN: denies any unusual skin lesions or rashes  RESPIRATORY: denies shortness of breath with exertion  NEURO: denies headaches    EXAM:   There were no vitals taken for this visit.    System Pertinent findings Details   Constitutional  Overall appearance - Normal.   Head/Face  Facial features -- Normal. Skull - Normal.   Eyes  Pupils equal ,round ,react to light and accomidate   Ears  External Ear Right: Normal, Left: Normal. Canal - Right: Normal, Left: Normal. TM - Right: Hair on TM along with wax left: Air in TM along with wax   Nose  External Nose, Normal, Septum -midline,Nasal Vault, clear. Turbinates - Right: Normal left: Normal   Mouth/Throat  Lips/teeth/gums - Normal. Tonsils -1+ oropharynx - Normal.   Neck Exam  Inspection - Normal. Palpation - Normal. Parotid gland - Normal. Thyroid gland -normal   Lymph Detail  Submental. Submandibular. Anterior cervical. Posterior cervical. Supraclavicular.   Patients exam showed wax impaction right ear, wax impaction left ear. Ear wax was removed under the operative microscope. No complications. Patient tolerated the procedure well. Ear exam findings listed in note after removal.  Additionally hairs were removed off the tympanic membrane bilaterally    ASSESSMENT AND PLAN:   1. Bilateral impacted cerumen  Follow-up as needed      The patient indicates understanding of these issues and agrees to the plan.      Tom Lang MD  6/17/2025  12:40 PM       [1]   Current Outpatient Medications    Medication Sig Dispense Refill    pantoprazole 40 MG Oral Tab EC Take 1 tablet (40 mg total) by mouth every morning before breakfast.      Multiple Vitamin (ONE-DAILY MULTI VITAMINS) Oral Tab Take 1 tablet by mouth daily.      aspirin 81 MG Oral Tab EC Take 1 tablet (81 mg total) by mouth daily.      Levothyroxine Sodium 137 MCG Oral Tab TK 1 T PO QD IN THE MORNING OES  1   [2]   Past Medical History:   Adopted    unknown family history    COVID-19    Symptoms included: Fever, cough, chills. Hospitalization not required, no lingering symptoms    Disorder of thyroid    Hearing loss    History of cardiac murmur    Pain in joints    Prediabetes    Problems with swallowing    Shortness of breath    Thyroid disease    Visual impairment    glasses   [3]   Social History  Socioeconomic History    Marital status:    Tobacco Use    Smoking status: Never    Smokeless tobacco: Never   Vaping Use    Vaping status: Never Used   Substance and Sexual Activity    Alcohol use: No    Drug use: No   Other Topics Concern    Caffeine Concern Yes     Comment: 3 -4 cups of coffee per day    Exercise Yes     Comment: several times per day   Social History Narrative    The patient does not use an assistive device..      The patient does live in a home with stairs.     Social Drivers of Health      Received [facility]    Housing Stability   [4]   Past Surgical History:  Procedure Laterality Date    Colonoscopy      Egd      multiple times    Tonsillectomy      Total knee replacement Right 08/22/2024

## (undated) DEVICE — Device: Brand: DEFENDO AIR/WATER/SUCTION AND BIOPSY VALVE

## (undated) DEVICE — 3M™ RED DOT™ MONITORING ELECTRODE WITH FOAM TAPE AND STICKY GEL 2570-3, 3/BAG, 200/CASE, 54/PLT: Brand: RED DOT™

## (undated) DEVICE — NEEDLE INJ 25GA CATH 230CM CHN 2.8MM ACUJECT

## (undated) DEVICE — ENDOSCOPY PACK UPPER: Brand: MEDLINE INDUSTRIES, INC.

## (undated) DEVICE — FORCEP BIOPSY RJ4 LG CAP W/ND

## (undated) DEVICE — SYRINGE/GUAGE ASSEMBLY

## (undated) DEVICE — ESOPHAGEAL/PYLORIC/COLONIC WIREGUIDED BALLOON DILATATION CATHETER: Brand: CRE WIREGUIDED

## (undated) DEVICE — FILTERLINE NASAL ADULT O2/CO2

## (undated) DEVICE — 3M™ RED DOT™ MONITORING ELECTRODE WITH FOAM TAPE AND STICKY GEL, 50/BAG, 20/CASE, 72/PLT 2570: Brand: RED DOT™

## (undated) DEVICE — KIT CUSTOM ENDOPROCEDURE STERIS

## (undated) DEVICE — MEDI-VAC NON-CONDUCTIVE SUCTION TUBING: Brand: CARDINAL HEALTH

## (undated) DEVICE — HERCULES 3 STAGE BALLOON ESOPHAGEAL: Brand: HERCULES

## (undated) DEVICE — KIT VLV 5 PC AIR H2O SUCT BX ENDOGATOR CONN

## (undated) DEVICE — 1200CC GUARDIAN II: Brand: GUARDIAN

## (undated) DEVICE — ACROBAT 2 CALIBRATED TIP WIRE GUIDE: Brand: ACROBAT

## (undated) DEVICE — CO2 CANN,MICROFILTER,ADULT,14',NASAL: Brand: MEDLINE

## (undated) DEVICE — ESOPHAGEAL BALLOON DILATATION CATHETER: Brand: CRE FIXED WIRE

## (undated) DEVICE — DEVICE INFL 60ML 15ATM PRSS COOK SPHR

## (undated) DEVICE — Device

## (undated) DEVICE — CAP DST ATTCH 11.35X4MM SCP

## (undated) NOTE — LETTER
AUTHORIZATION FOR SURGICAL OPERATION OR OTHER PROCEDURE    1. I hereby authorize Dr. Noah Santos and the [facility] staff assigned to my case to perform the following operation and/or procedure at the [facility]:    BILATERAL knee CSI under ultrasound guidance    _______________________________________________________________________________________________    2. My physician has explained the nature and purpose of the operation or other procedure, possible alternative methods of treatment, the risks involved, and the possibility of complication to me. I acknowledge that no guarantee has been made as to the result that may be obtained. 3.  I recognize that, during the course of this operation, or other procedure, unforseen conditions may necessitate additional or different procedure than those listed above. I, therefore, further authorize and request that the above named physician, his/her physician assistants or designees perform such procedures as are, in his/her professional opinion, necessary and desirable. 4.  Any tissue or organs removed in the operation or other procedure may be disposed of by and at the discretion of the [facility] staff and [facility]. 5.  I understand that in the event of a medical emergency, I will be transported by local paramedics to [facility] or other hospital emergency department. 6.  I certify that I have read and fully understand the above consent to operation and/or other procedure. 7.  I acknowledge that my physician has explained sedation/analgesia administration to me including the risks and benefits. I consent to the administration of sedation/analgesia as may be necessary or desirable in the judgement of my physician. Witness signature: ___________________________________________________ Date:  ______/______/_____                    Time:  ________ A. M.  P.M.        Patient Name:  Thee Hussein  (please print)       Patient signature:  ___________________________________________________             Relationship to Patient:           []  Parent    Responsible person                          []  Spouse  In case of minor or                    [] Other  _____________   Incompetent name:  __________________________________________________                               (please print)      _____________      Responsible person  In case of minor or  Incompetent signature:  _______________________________________________    Statement of Physician  My signature below affirms that prior to the time of the procedure, I have explained to the patient and/or his/her guardian, the risks and benefits involved in the proposed treatment and any reasonable alternative to the proposed treatment. I have also explained the risks and benefits involved in the refusal of the proposed treatment and have answered the patient's questions.                         Date:  ______/______/_______  Provider                      Signature:  __________________________________________________________       Time:  ___________ A.M    P.M.

## (undated) NOTE — LETTER
AUTHORIZATION FOR SURGICAL OPERATION OR OTHER PROCEDURE    1.  I hereby authorize Dr. Rudi Sprague  and the [facility] staff assigned to my case to perform the following operation and/or procedure at the [facility]:    ________________________________________ Patient Name:  ______________________________________________________  (please print)       Patient signature:  ___________________________________________________             Relationship to Patient:           []  Parent    Responsible person

## (undated) NOTE — LETTER
Notifier: [facility]       Patient Name: Bennie Yousif       Identification Number: [id number]                          Advance Beneficiary Notice of Noncoverage (ABN)   NOTE:  If Medicare doesn’t pay for D. Items/service(s) below, you may have to pay.  Medicare does not pay for everything, even some care that you or your health care provider have good reason to think you need. We expect Medicare may not pay for the D. items/service(s) below.  Items or Services Reason Medicare May Not Pay: Estimated Cost         BILATERAL knee CSI under ultrasound guidance    __ Medicare does not cover this service      __ Medicare may not pay for this   item/service for your condition     __ Medicare may not pay for this item/service as often as this        WHAT YOU NEED TO DO NOW:  Read this notice, so you can make an informed decision about your care.  Ask us any questions that you may have after you finish reading.  Choose an option below about whether to receive the D. item/service(s)  listed above.  Note: If you choose Option 1 or 2, we may help you to use any other insurance that you might have, but Medicare cannot require us to do this.  OPTIONS: Check only one box.  We cannot choose a box for you.   OPTION 1. I want the D. item/service(s) listed above. You may ask to be paid now, but I also want Medicare billed for an official decision on payment, which is sent to me on a Medicare Summary Notice (MSN). I understand that if Medicare doesn’t pay, I am responsible for payment, but I can appeal to Medicare by following the directions on the MSN. If Medicare does pay, you will refund any payments I made to you, less co-pays or deductibles.  OPTION 2. I want the D. item/service(s) listed above, but do not bill Medicare. You may ask to be paid now as I am responsible for payment. I cannot appeal if Medicare is not billed.  OPTION 3. I don't want the D. item/service(s) listed above. I understand with this  choice I am not responsible for payment, and I cannot appeal to see if Medicare would pay.    H. Additional Information:    This notice gives our opinion, not an official Medicare decision. If you have other questions on this notice or Medicare billing, call 1-800-MEDICARE (1-850.907.5502/TTY: [phone]). Signing below means that you have received and understand this notice. You also receive a copy.  Signature: Date:       You have the right to get Medicare information in an accessible format, like large print, Braille, or audio. You also have the right to file a complaint if you feel you’ve been discriminated against. Visit Medicare.gov/about- us/uorjrqarsyvfd-oshfzupfptswczzhz-nrdzzb.  According to the Paperwork Reduction Act of 1995, no persons are required to respond to a collection of information unless it displays a valid OMB control number. The valid OMB control number for this information collection is 9861-3832. The time required to complete this information collection is estimated to average 7 minutes per response, including the time to review instructions, search existing data resources, gather the data needed, and complete and review the information collection. If you have comments concerning the accuracy of the time estimate or suggestions for improving this form, please write to: CMS, [address]     [place], Attn: VERONICA Reports Clearance Officer, [place], [place] [address].  Form CMS-R-131 (Exp. 1/31/2026) Form Approved OMB No. [id number]

## (undated) NOTE — LETTER
AUTHORIZATION FOR SURGICAL OPERATION OR OTHER PROCEDURE    1. I hereby authorize Dr. Justine Newell and the [facility] staff assigned to my case to perform the following operation and/or procedure at the [facility]:    BILATERAL knee CSI under ultrasound guidance    2. My physician has explained the nature and purpose of the operation or other procedure, possible alternative methods of treatment, the risks involved, and the possibility of complication to me. I acknowledge that no guarantee has been made as to the result that may be obtained. 3.  I recognize that, during the course of this operation, or other procedure, unforseen conditions may necessitate additional or different procedure than those listed above. I, therefore, further authorize and request that the above named physician, his/her physician assistants or designees perform such procedures as are, in his/her professional opinion, necessary and desirable. 4.  Any tissue or organs removed in the operation or other procedure may be disposed of by and at the discretion of the [facility] staff and [facility] AT [facility]. 5.  I understand that in the event of a medical emergency, I will be transported by local paramedics to [facility] or other hospital emergency department. 6.  I certify that I have read and fully understand the above consent to operation and/or other procedure. 7.  I acknowledge that my physician has explained sedation/analgesia administration to me including the risks and benefits. I consent to the administration of sedation/analgesia as may be necessary or desirable in the judgement of my physician. Witness signature: ___________________________________________________ Date:  ______/______/_____                    Time:  ________ A. M.  P.M.        Patient Name: Jenn Sweeney  7/13/1955  [id number]       Patient signature:  ___________________________________________________               Statement of Physician  My signature below affirms that prior to the time of the procedure, I have explained to the patient and/or his/her guardian, the risks and benefits involved in the proposed treatment and any reasonable alternative to the proposed treatment. I have also explained the risks and benefits involved in the refusal of the proposed treatment and have answered the patient's questions.                         Date:  ______/______/_______  Provider                      Signature:  __________________________________________________________       Time:  ___________ A.M    P.M.

## (undated) NOTE — LETTER
AUTHORIZATION FOR SURGICAL OPERATION OR OTHER PROCEDURE    1.  I hereby authorize Dr. Av Sargent and the [facility] staff assigned to my case to perform the following operation and/or procedure at the [facility]:    ___________________________________________ Molly Cocker   7/13/1955   [id number]  Patient Name:  ______________________________________________________  (please print)       Patient signature:  ___________________________________________________             Relationship

## (undated) NOTE — LETTER
AUTHORIZATION FOR SURGICAL OPERATION OR OTHER PROCEDURE    1. I hereby authorize Dr. Oswaldo Sweet and the [facility] staff assigned to my case to perform the following operation and/or procedure at the [facility]:    BILATERAL knee HA and CSI under ultrasound guidance    2. My physician has explained the nature and purpose of the operation or other procedure, possible alternative methods of treatment, the risks involved, and the possibility of complication to me. I acknowledge that no guarantee has been made as to the result that may be obtained. 3.  I recognize that, during the course of this operation, or other procedure, unforseen conditions may necessitate additional or different procedure than those listed above. I, therefore, further authorize and request that the above named physician, his/her physician assistants or designees perform such procedures as are, in his/her professional opinion, necessary and desirable. 4.  Any tissue or organs removed in the operation or other procedure may be disposed of by and at the discretion of the [facility] staff and [facility] AT [facility]. 5.  I understand that in the event of a medical emergency, I will be transported by local paramedics to [facility] or other hospitals emergency department. 6.  I certify that I have read and fully understand the above consent to operation and/or other procedure. 7.  I acknowledge that my physician has explained sedation/analgesia administration to me including the risks and benefits. I consent to the administration of sedation/analgesia as may be necessary or desirable in the judgement of my physician. Witness signature: ___________________________________________________ Date:  ______/______/_____                    Time:  ________ A. M.  P.M.        Patient Name: Jaren Whiting  7/13/1955  [id number]         Patient signature: ___________________________________________________                 Statement of Physician  My signature below affirms that prior to the time of the procedure, I have explained to the patient and/or his/her guardian, the risks and benefits involved in the proposed treatment and any reasonable alternative to the proposed treatment. I have also explained the risks and benefits involved in the refusal of the proposed treatment and have answered the patient's questions.                         Date:  ______/______/_______  Provider                      Signature:  __________________________________________________________       Time:  ___________ AYEVGENIY ROSS

## (undated) NOTE — LETTER
AVI Notifier: Daniel/HAM-IT   DEACON. Patient Name: Tea Puente. Identification Number: [id number]      Advance Beneficiary Notice of Noncoverage (ABN)  NOTE:  If Medicare doesn’t pay for D. item/service below, you may have to pay.   Medicare do see if Medicare would pay. H. Additional Information: This notice gives our opinion, not an official Medicare decision. If you have other questions on this notice or Medicare billing, call 1-800-MEDICARE ([phone]/RJT: [phone]).   [place]

## (undated) NOTE — LETTER
AUTHORIZATION FOR SURGICAL OPERATION OR OTHER PROCEDURE    1. I hereby authorize Dr. Alex Behar and the [facility] staff assigned to my case to perform the following operation and/or procedure at the [facility]:    BILATERAL knee CSI under ultrasound guidance     2.  My physician has explained the nature and purpose of the operation or other procedure, possible alternative methods of treatment, the risks involved, and the possibility of complication to me.  I acknowledge that no guarantee has been made as to the result that may be obtained.  3.  I recognize that, during the course of this operation, or other procedure, unforseen conditions may necessitate additional or different procedure than those listed above.  I, therefore, further authorize and request that the above named physician, his/her physician assistants or designees perform such procedures as are, in his/her professional opinion, necessary and desirable.  4.  Any tissue or organs removed in the operation or other procedure may be disposed of by and at the discretion of the [facility] staff and [facility].  5.  I understand that in the event of a medical emergency, I will be transported by local paramedics to [facility] or other hospital emergency department.  6.  I certify that I have read and fully understand the above consent to operation and/or other procedure.    7.  I acknowledge that my physician has explained sedation/analgesia administration to me including the risks and benefits.  I consent to the administration of sedation/analgesia as may be necessary or desirable in the judgement of my physician.    Witness signature: ___________________________________________________ Date:  ______/______/_____                    Time:  ________ A.M.  P.M.       Patient Name:  Bennie Yousif  7/13/1955  [id number]         Patient signature:   ___________________________________________________                   Statement of Physician  My signature below affirms that prior to the time of the procedure, I have explained to the patient and/or his/her guardian, the risks and benefits involved in the proposed treatment and any reasonable alternative to the proposed treatment.  I have also explained the risks and benefits involved in the refusal of the proposed treatment and have answered the patient's questions.                        Date:  ______/______/_______  Provider                      Signature:  __________________________________________________________       Time:  ___________ AYEVGENIY ROSS

## (undated) NOTE — LETTER
AUTHORIZATION FOR SURGICAL OPERATION OR OTHER PROCEDURE    1. I hereby authorize Dr. Shellie Bautista and the [facility] staff assigned to my case to perform the following operation and/or procedure at the [facility]:    BILATERAL knee [address] and CSI under ultrasound guidance     2. My physician has explained the nature and purpose of the operation or other procedure, possible alternative methods of treatment, the risks involved, and the possibility of complication to me. I acknowledge that no guarantee has been made as to the result that may be obtained. 3.  I recognize that, during the course of this operation, or other procedure, unforseen conditions may necessitate additional or different procedure than those listed above. I, therefore, further authorize and request that the above named physician, his/her physician assistants or designees perform such procedures as are, in his/her professional opinion, necessary and desirable. 4.  Any tissue or organs removed in the operation or other procedure may be disposed of by and at the discretion of the [facility] staff and [facility] AT [facility]. 5.  I understand that in the event of a medical emergency, I will be transported by local paramedics to [facility] or other [facility] emergency department. 6.  I certify that I have read and fully understand the above consent to operation and/or other procedure. 7.  I acknowledge that my physician has explained sedation/analgesia administration to me including the risks and benefits. I consent to the administration of sedation/analgesia as may be necessary or desirable in the judgement of my physician. Witness signature: ___________________________________________________ Date:  ______/______/_____                    Time:  ________ A. M.  P.M.        Patient Name:  Danita Salcido  7/13/1955  [id number]         Patient signature: ___________________________________________________               Statement of Physician  My signature below affirms that prior to the time of the procedure, I have explained to the patient and/or his/her guardian, the risks and benefits involved in the proposed treatment and any reasonable alternative to the proposed treatment. I have also explained the risks and benefits involved in the refusal of the proposed treatment and have answered the patient's questions.                         Date:  ______/______/_______  Provider                      Signature:  __________________________________________________________       Time:  ___________ AYEVGENIY ROSS